# Patient Record
Sex: MALE | Race: WHITE
[De-identification: names, ages, dates, MRNs, and addresses within clinical notes are randomized per-mention and may not be internally consistent; named-entity substitution may affect disease eponyms.]

---

## 2019-10-27 ENCOUNTER — HOSPITAL ENCOUNTER (OUTPATIENT)
Dept: HOSPITAL 46 - ED | Age: 74
Setting detail: OBSERVATION
LOS: 2 days | Discharge: HOME | End: 2019-10-29
Attending: INTERNAL MEDICINE | Admitting: INTERNAL MEDICINE
Payer: MEDICARE

## 2019-10-27 VITALS — HEIGHT: 69 IN | BODY MASS INDEX: 33.77 KG/M2 | WEIGHT: 228 LBS

## 2019-10-27 DIAGNOSIS — Z85.46: ICD-10-CM

## 2019-10-27 DIAGNOSIS — N40.0: ICD-10-CM

## 2019-10-27 DIAGNOSIS — I48.20: ICD-10-CM

## 2019-10-27 DIAGNOSIS — Z23: ICD-10-CM

## 2019-10-27 DIAGNOSIS — Z79.01: ICD-10-CM

## 2019-10-27 DIAGNOSIS — Z79.899: ICD-10-CM

## 2019-10-27 DIAGNOSIS — L03.116: Primary | ICD-10-CM

## 2019-10-27 PROCEDURE — G0378 HOSPITAL OBSERVATION PER HR: HCPCS

## 2019-10-27 NOTE — NUR
BEDSIDE REPORT RECEIVED FROM OFFGOING RN, SEEMA. PT RESTING IN BED WITH WIFE
AT BEDSIDE. PT LLE VISUALIZED. PT'S WIFE STATES VAST IMPROVEMENT OF EXTREMITIY
AT THIS TIME. PT DENIES NEEDS, CALL LIGHT IN REACH.

## 2019-10-27 NOTE — XMS
Clinical Summary
  Created on: 10/27/2019
 
 Alex Hays
 External Reference #: [id number]
 : [birth year]
 Sex: Male
 
 Demographics
 
 
+-----------------------+----------------------+
| Address               | [address]      |
|                       | [place]  [address] |
+-----------------------+----------------------+
| Home Phone            | [phone]      |
+-----------------------+----------------------+
| Preferred Language    | Unknown              |
+-----------------------+----------------------+
| Marital Status        |               |
+-----------------------+----------------------+
| Congregational Affiliation | [id number]                 |
+-----------------------+----------------------+
| Race                  | Unknown              |
+-----------------------+----------------------+
| Ethnic Group          | Unknown              |
+-----------------------+----------------------+
 
 
 Author
 
 
+--------------+--------------------------------------------+
| Author       | [facility]  |
|              | and [place]                                |
+--------------+--------------------------------------------+
| Organization | [facility]  |
|              | and [place]                                |
+--------------+--------------------------------------------+
| Address      | Unknown                                    |
+--------------+--------------------------------------------+
| Phone        | Unavailable                                |
+--------------+--------------------------------------------+
 
 
 
 Support
 
 
+------------+--------------+-------------------+-----------------+
| Name       | Relationship | Address           | Phone           |
+------------+--------------+-------------------+-----------------+
| Frances Hays | ECON         | [address]        | [phone] |
|            |              | [place]   |                 |
|            |              | [address]             |                 |
+------------+--------------+-------------------+-----------------+
 
 
 
 
 Care Team Providers
 
 
+-------------------------+------+-----------------+
| Care Team Member Name   | Role | Phone           |
+-------------------------+------+-----------------+
| Chai Wetzel MD | PCP  | [phone] |
+-------------------------+------+-----------------+
 
 
 
 Allergies
 No Known Allergies
 
 Medications
 
 
+----------------------+----------------------+-----------+---------+------+------+-------+
| Medication           | Sig                  | Dispensed | Refills | Star | End  | Statu |
|                      |                      |           |         | t    | Date | s     |
|                      |                      |           |         | Date |      |       |
+----------------------+----------------------+-----------+---------+------+------+-------+
|   allopurinol        | Take 300 mg by mouth |           | 0       |      |      | Activ |
| (ZYLOPRIM) 300 mg    |  Daily.              |           |         |      |      | e     |
| tablet               |                      |           |         |      |      |       |
+----------------------+----------------------+-----------+---------+------+------+-------+
|   cholecalciferol    | Take 6,000 Units by  |           | 0       |      |      | Activ |
| (VITAMIN D-3) 1,000  | mouth Daily.         |           |         |      |      | e     |
| units capsule        |                      |           |         |      |      |       |
+----------------------+----------------------+-----------+---------+------+------+-------+
|   warfarin           | Take 4 mg by mouth   |           | 0       |      |      | Activ |
| (COUMADIN) 4 MG      | Daily. Takes 4mg for |           |         |      |      | e     |
| tablet               |  5 days, 6mg on      |           |         |      |      |       |
|                      | Tuesday and Thursday |           |         |      |      |       |
+----------------------+----------------------+-----------+---------+------+------+-------+
|   tamsulosin         |                      |           | 0       | 03/0 |      | Activ |
| (FLOMAX) 0.4 mg CAPS |                      |           |         | 6/20 |      | e     |
|                      |                      |           |         | 18   |      |       |
+----------------------+----------------------+-----------+---------+------+------+-------+
|   allopurinol        | Take 100 mg by mouth |           | 0       |      |      | Activ |
| (ZYLOPRIM) 100 mg    |  every day.          |           |         |      |      | e     |
| tablet               |                      |           |         |      |      |       |
+----------------------+----------------------+-----------+---------+------+------+-------+
|   warfarin           | Take 6 mg by mouth   |           | 0       |      |      | Activ |
| (COUMADIN) 1 mg      | every day.           |           |         |      |      | e     |
| tablet               |                      |           |         |      |      |       |
+----------------------+----------------------+-----------+---------+------+------+-------+
|   clobetasol         |                      |           | 1       | 03/0 |      | Activ |
| (TEMOVATE) 0.05 %    |                      |           |         | 5/20 |      | e     |
| external solution    |                      |           |         | 19   |      |       |
+----------------------+----------------------+-----------+---------+------+------+-------+
|   gabapentin         | TK 1 C PO QHS.       |           | 2       | 10/0 |      | Activ |
| (NEURONTIN) 100 mg   | INCREASE BY 1 C Q 2  |           |         | 2/20 |      | e     |
| capsule              | TO 3 NIGHTS UP TO 3  |           |         | 18   |      |       |
|                      | CS UP TO 3 CS HS     |           |         |      |      |       |
+----------------------+----------------------+-----------+---------+------+------+-------+
|   NYSTOP [id number]      | APPLY 1 APPLICATION  |           | 3       | 07/0 |      | Activ |
| UNIT/GM powder       | TO AFFECTED AREA BID |           |         | 1/20 |      | e     |
|                      |  FOR 30 DAYS         |           |         | 19   |      |       |
 
+----------------------+----------------------+-----------+---------+------+------+-------+
|   triamcinolone      | APPLY A THIN LAYER   |           | 0       | 05/0 |      | Activ |
| (KENALOG) 0.1% cream | TOPICALLY AA BID.    |           |         | 1/20 |      | e     |
|                      | FOLLOW WITH          |           |         | 19   |      |       |
|                      | MOISTURIZER AS       |           |         |      |      |       |
|                      | DIRECTED             |           |         |      |      |       |
+----------------------+----------------------+-----------+---------+------+------+-------+
|   warfarin           |                      |           | 3       | 05/2 |      | Activ |
| (COUMADIN) 3 MG      |                      |           |         | 4/20 |      | e     |
| tablet               |                      |           |         | 19   |      |       |
+----------------------+----------------------+-----------+---------+------+------+-------+
 
 
 
 Active Problems
 
 
+------------------------------------------------------------------+------------+
| Problem                                                          | Noted Date |
+------------------------------------------------------------------+------------+
| Malignant neoplasm of prostate                                   | 2018 |
+------------------------------------------------------------------+------------+
| Lumbar disc herniation                                           | 2017 |
+------------------------------------------------------------------+------------+
| Intervertebral disc disorder with radiculopathy of lumbar region | 2017 |
+------------------------------------------------------------------+------------+
| Lumbar radiculopathy                                             | 2017 |
+------------------------------------------------------------------+------------+
| Lumbar spinal stenosis                                           | 2017 |
+------------------------------------------------------------------+------------+
| Lumbar foraminal stenosis                                        | 2017 |
+------------------------------------------------------------------+------------+
| DDD (degenerative disc disease), lumbar                          | 2017 |
+------------------------------------------------------------------+------------+
| S/P laminectomy - L4/L5                                          | 2017 |
+------------------------------------------------------------------+------------+
| Ulnar neuropathy                                                 | 2013 |
+------------------------------------------------------------------+------------+
| SLEEP APNEA, OBSTRUCTIVE                                         | 2008 |
+------------------------------------------------------------------+------------+
| ATRIAL FIBRILLATION                                              | 2008 |
+------------------------------------------------------------------+------------+
 
 
 
 Encounters
 
 
+--------+-------------+--------------------+----------------------+---------------------+
| Date   | Type        | Specialty          | Care Team            | Description         |
+--------+-------------+--------------------+----------------------+---------------------+
| 10/18/ | Hospital    | Radiation Oncology |   Xenia Pulido  | Malignant neoplasm  |
| 2019   | Encounter   |                    | MD Yifan HARRISON,        | of prostate (HCC)   |
|        |             |                    | MD Talib           | (Primary Dx)        |
+--------+-------------+--------------------+----------------------+---------------------+
| 09/10/ | Orders Only | Radiation Oncology |   Talib Saha MD  | Malignant neoplasm  |
|    |             |                    |                      | of prostate (HCC)   |
|        |             |                    |                      | (Primary Dx)        |
+--------+-------------+--------------------+----------------------+---------------------+
 from Last 3 Months
 
 
 Family History
 
 
+----------------------+-----------+------+----------+
| Medical History      | Relation  | Name | Comments |
+----------------------+-----------+------+----------+
| Colon cancer         | Brother   |      |          |
+----------------------+-----------+------+----------+
| Heart surgery        | Brother   |      |          |
+----------------------+-----------+------+----------+
| No known problems    | Child     |      |          |
+----------------------+-----------+------+----------+
| No known problems    | Child     |      |          |
+----------------------+-----------+------+----------+
| No known problems    | Child     |      |          |
+----------------------+-----------+------+----------+
| No known problems    | Child     |      |          |
+----------------------+-----------+------+----------+
| Cancer               | Father    |      |          |
+----------------------+-----------+------+----------+
| Deep vein thrombosis | Father    |      |          |
+----------------------+-----------+------+----------+
| No known problems    | Maternal  |      |          |
|                      | Grandfath |      |          |
|                      | er        |      |          |
+----------------------+-----------+------+----------+
| No known problems    | Maternal  |      |          |
|                      | Grandmoth |      |          |
|                      | er        |      |          |
+----------------------+-----------+------+----------+
| Cancer               | Mother    |      |          |
+----------------------+-----------+------+----------+
| Hypertension         | Mother    |      |          |
+----------------------+-----------+------+----------+
| Gout                 | Paternal  |      |          |
|                      | Grandfath |      |          |
|                      | er        |      |          |
+----------------------+-----------+------+----------+
| Hypertension         | Paternal  |      |          |
|                      | Grandmoth |      |          |
|                      | er        |      |          |
+----------------------+-----------+------+----------+
 
 
 
+----------------------+------+----------+-----------------------------+
| Relation             | Name | Status   | Comments                    |
+----------------------+------+----------+-----------------------------+
| Brother              |      |  | HEART SURGERY COMPLICATIONS |
|                      |      |   (Age   |                             |
|                      |      | 66)      |                             |
+----------------------+------+----------+-----------------------------+
| Brother              |      |          |                             |
+----------------------+------+----------+-----------------------------+
| Child                |      | Other    | STATUS NOT LISTED           |
+----------------------+------+----------+-----------------------------+
| Child                |      | Other    | STATUS NOT LISTED           |
+----------------------+------+----------+-----------------------------+
| Child                |      | Other    | STATUS NOT LISTED           |
 
+----------------------+------+----------+-----------------------------+
| Child                |      | Other    | STATUS NOT LISTED           |
+----------------------+------+----------+-----------------------------+
| Child                |      |          |                             |
+----------------------+------+----------+-----------------------------+
| Child                |      |          |                             |
+----------------------+------+----------+-----------------------------+
| Child                |      |          |                             |
+----------------------+------+----------+-----------------------------+
| Child                |      |          |                             |
+----------------------+------+----------+-----------------------------+
| Father               |      |  |                             |
|                      |      |   (Age   |                             |
|                      |      | 84)      |                             |
+----------------------+------+----------+-----------------------------+
| Maternal Grandfather |      |  |                             |
+----------------------+------+----------+-----------------------------+
| Maternal Grandmother |      |  |                             |
+----------------------+------+----------+-----------------------------+
| Mother               |      |  |                             |
|                      |      |   (Age   |                             |
|                      |      | 86)      |                             |
+----------------------+------+----------+-----------------------------+
| Paternal Grandfather |      |  |                             |
+----------------------+------+----------+-----------------------------+
| Paternal Grandmother |      |  |                             |
+----------------------+------+----------+-----------------------------+
 
 
 
 Social History
 
 
+--------------+-------+-----------+--------+------+
| Tobacco Use  | Types | Packs/Day | Years  | Date |
|              |       |           | Used   |      |
+--------------+-------+-----------+--------+------+
| Never Smoker |       |           |        |      |
+--------------+-------+-----------+--------+------+
 
 
 
+---------------------+---+---+---+
| Smokeless Tobacco:  |   |   |   |
| Never Used          |   |   |   |
+---------------------+---+---+---+
 
 
 
+-------------+-----------+---------+--------------+
| Alcohol Use | Drinks/We | oz/Week | Comments     |
|             | ek        |         |              |
+-------------+-----------+---------+--------------+
| Yes         |   0       | 0.0     | occasionally |
|             | Standard  |         |              |
|             | drinks or |         |              |
|             |           |         |              |
|             | equivalen |         |              |
|             | t         |         |              |
+-------------+-----------+---------+--------------+
 
 
 
 
+------------------+---------------+
| Sex Assigned at  | Date Recorded |
| Birth            |               |
+------------------+---------------+
| Not on file      |               |
+------------------+---------------+
 
 
 
+----------------+-------------+-------------+
| Job Start Date | Occupation  | Industry    |
+----------------+-------------+-------------+
| Not on file    | Not on file | Not on file |
+----------------+-------------+-------------+
 
 
 
+----------------+--------------+------------+
| Travel History | Travel Start | Travel End |
+----------------+--------------+------------+
 
 
 
+-------------------------------------+
| No recent travel history available. |
+-------------------------------------+
 
 
 
 Last Filed Vital Signs
 
 
+-------------------+-------------------+---------------------+
| Vital Sign        | Reading           | Time Taken          |
+-------------------+-------------------+---------------------+
| Blood Pressure    | 123/79            | 2018 1157 PST |
+-------------------+-------------------+---------------------+
| Pulse             | 87                | [id number] PDT |
+-------------------+-------------------+---------------------+
| Temperature       | -                 | -                   |
+-------------------+-------------------+---------------------+
| Respiratory Rate  | 16                | 2017 PDT |
+-------------------+-------------------+---------------------+
| Oxygen Saturation | -                 | -                   |
+-------------------+-------------------+---------------------+
| Inhaled Oxygen    | -                 | -                   |
| Concentration     |                   |                     |
+-------------------+-------------------+---------------------+
| Weight            | 104.8 kg (231 lb) | 2018 PST |
+-------------------+-------------------+---------------------+
| Height            | 175.3 cm (5' 9")  | 2018 PST |
+-------------------+-------------------+---------------------+
| Body Mass Index   | 34.11             | 2018 1157 PST |
+-------------------+-------------------+---------------------+
 
 
 
 
 Plan of Treatment
 
 
+---------------------+-----------+---------------------------------+----------+
| Health Maintenance  | Due Date  | Last Done                       | Comments |
+---------------------+-----------+---------------------------------+----------+
| Vaccine:            | 10/12/196 |                                 |          |
| Dtap/Tdap/Td (1 -   | 2         |                                 |          |
| Tdap)               |           |                                 |          |
+---------------------+-----------+---------------------------------+----------+
| Vaccine:            | 10/12/200 |                                 |          |
| Pneumococcal 65+    | 8         |                                 |          |
| Low/Medium Risk (1  |           |                                 |          |
| of 2 - PCV13)       |           |                                 |          |
+---------------------+-----------+---------------------------------+----------+
| Vaccine: Zoster (2  |  | 2014                      |          |
| of 3)               | 4         |                                 |          |
+---------------------+-----------+---------------------------------+----------+
| Vaccine: Influenza  |  | 2018, 2018,         |          |
| (#1)                | 9         | 2016, Additional history  |          |
|                     |           | exists                          |          |
+---------------------+-----------+---------------------------------+----------+
 
 
 
 Results
 Not on filefrom Last 3 Months
 
 Insurance
 
 
+-----------------+--------+-------------+--------+-------------+---------+--------+
| Payer           | Benefi | Subscriber  | Effect | Phone       | Address | Type   |
|                 | t Plan | ID          | flor    |             |         |        |
|                 |  /     |             | Dates  |             |         |        |
|                 | Group  |             |        |             |         |        |
+-----------------+--------+-------------+--------+-------------+---------+--------+
| MEDICARE        | MEDICA | [id number]  | 10/1/2 | [phone] |         | [employer] |
|                 | RE     |             | 008-Pr | 5           |         | re     |
|                 | PART A |             | esent  |             |         |        |
|                 |  AND B |             |        |             |         |        |
+-----------------+--------+-------------+--------+-------------+---------+--------+
| [employer] | [employer] | [id number]    |  | [phone] |         | Indemn |
|                 |  OF    |             | 015-Pr | 0           |         | ity    |
|                 | [employer]  |             | esent  |             |         |        |
|                 | MDCR   |             |        |             |         |        |
|                 | SUPPL  |             |        |             |         |        |
+-----------------+--------+-------------+--------+-------------+---------+--------+
 
 
 
+------------------+--------+-------------+--------+-------------+---------------------+
| Guarantor Name   | Accoun | Relation to | Date   | Phone       | Billing Address     |
|                  | t Type |  Patient    | of     |             |                     |
|                  |        |             | Birth  |             |                     |
+------------------+--------+-------------+--------+-------------+---------------------+
| Alex Hays | Person | Self        | [birth year]             |   [address]   |
|                  | al/Fam |             | 1945   | [phone] | [place] [address] |
|                  | chyna    |             |        | 8 (Home)    |                     |
+------------------+--------+-------------+--------+-------------+---------------------+
 
| Alex Hays | Person | Self        | [birth year] |             |   [address]   |
|                  | al/Fam |             | 1945   | [phone] | [place] [address] |
|                  | chyna    |             |        | 8 (Home)    |                     |
+------------------+--------+-------------+--------+-------------+---------------------+
 
 
 
 Advance Directives
 Patient has advance care planning documents on file. For more information, please contact:[facility] [place] [address]

## 2019-10-27 NOTE — XMS
Encounter Summary
  Created on: 10/27/2019
 
 Alex Hays
 External Reference #: [id number]
 : [birth year]
 Sex: Male
 
 Demographics
 
 
+-----------------------+----------------------+
| Address               | [address]      |
|                       | [place]  [address] |
+-----------------------+----------------------+
| Home Phone            | [phone]      |
+-----------------------+----------------------+
| Preferred Language    | Unknown              |
+-----------------------+----------------------+
| Marital Status        |               |
+-----------------------+----------------------+
| Rastafarian Affiliation | [id number]                 |
+-----------------------+----------------------+
| Race                  | Unknown              |
+-----------------------+----------------------+
| Ethnic Group          | Unknown              |
+-----------------------+----------------------+
 
 
 Author
 
 
+--------------+--------------------------------------------+
| Author       | [facility]  |
|              | and [facility]                                |
+--------------+--------------------------------------------+
| Organization | [facility]  |
|              | and [place]                                |
+--------------+--------------------------------------------+
| Address      | Unknown                                    |
+--------------+--------------------------------------------+
| Phone        | Unavailable                                |
+--------------+--------------------------------------------+
 
 
 
 Support
 
 
+------------+--------------+-------------------+-----------------+
| Name       | Relationship | Address           | Phone           |
+------------+--------------+-------------------+-----------------+
| Frances Hays | ECON         | [address]        | [phone] |
|            |              | [place]   |                 |
|            |              | [address]             |                 |
+------------+--------------+-------------------+-----------------+
 
 
 
 
 Care Team Providers
 
 
+-------------------------+------+-----------------+
| Care Team Member Name   | Role | Phone           |
+-------------------------+------+-----------------+
| Chai Wetzel MD | PCP  | [phone] |
+-------------------------+------+-----------------+
 
 
 
 Encounter Details
 
 
+--------+-------------+----------------------+----------------------+---------------------+
| Date   | Type        | Department           | Care Team            | Description         |
+--------+-------------+----------------------+----------------------+---------------------+
| 09/10/ | Orders Only |   [facility]     |   Talib Saha MD  | Malignant neoplasm  |
| 2019   |             | [facility]    |  [address]   | of prostate (HCC)   |
|        |             | [address] | [address]  [place], [place]     | (Primary Dx)        |
|        |             |   [place], [place]        | [address]  [phone]  |                     |
|        |             | [address]           |  [phone] (Fax)  |                     |
|        |             | [phone]         |                      |                     |
+--------+-------------+----------------------+----------------------+---------------------+
 
 
 
 Social History
 
 
+--------------+-------+-----------+--------+------+
| Tobacco Use  | Types | Packs/Day | Years  | Date |
|              |       |           | Used   |      |
+--------------+-------+-----------+--------+------+
| Never Smoker |       |           |        |      |
+--------------+-------+-----------+--------+------+
 
 
 
+---------------------+---+---+---+
| Smokeless Tobacco:  |   |   |   |
| Never Used          |   |   |   |
+---------------------+---+---+---+
 
 
 
+-------------+-----------+---------+--------------+
| Alcohol Use | Drinks/We | oz/Week | Comments     |
|             | ek        |         |              |
+-------------+-----------+---------+--------------+
| Yes         |   0       | 0.0     | occasionally |
|             | Standard  |         |              |
|             | drinks or |         |              |
|             |           |         |              |
|             | equivalen |         |              |
|             | t         |         |              |
+-------------+-----------+---------+--------------+
 
 
 
 
+------------------+---------------+
| Sex Assigned at  | Date Recorded |
| Birth            |               |
+------------------+---------------+
| Not on file      |               |
+------------------+---------------+
 
 
 
+----------------+-------------+-------------+
| Job Start Date | Occupation  | Industry    |
+----------------+-------------+-------------+
| Not on file    | Not on file | Not on file |
+----------------+-------------+-------------+
 
 
 
+----------------+--------------+------------+
| Travel History | Travel Start | Travel End |
+----------------+--------------+------------+
 
 
 
+-------------------------------------+
| No recent travel history available. |
+-------------------------------------+
 documented as of this encounter
 
 Plan of Treatment
 
 
+-----------------+--------+----------------------+----------------------+
| Name            | Priori | Associated Diagnoses | Order Schedule       |
|                 | ty     |                      |                      |
+-----------------+--------+----------------------+----------------------+
| PSA, Diagnostic | Routin |   Malignant neoplasm | 1 Occurrences        |
|                 | e      |  of prostate (HCC)   | starting 09/10/2019  |
|                 |        |                      | until 09/10/2020     |
+-----------------+--------+----------------------+----------------------+
 documented as of this encounter
 
 Visit Diagnoses
 
 
+----------------------------------------------------------------------------------+
| Diagnosis                                                                        |
+----------------------------------------------------------------------------------+
|   Malignant neoplasm of prostate (HCC) - Primary  Malignant neoplasm of prostate |
+----------------------------------------------------------------------------------+
 documented in this encounter

## 2019-10-27 NOTE — XMS
Encounter Summary
  Created on: 10/27/2019
 
 Alex Hays
 External Reference #: [id number]
 : [birth year]
 Sex: Male
 
 Demographics
 
 
+-----------------------+----------------------+
| Address               | [address]      |
|                       | [place]  [address] |
+-----------------------+----------------------+
| Home Phone            | [phone]      |
+-----------------------+----------------------+
| Preferred Language    | Unknown              |
+-----------------------+----------------------+
| Marital Status        |               |
+-----------------------+----------------------+
| Mu-ism Affiliation | 1069                 |
+-----------------------+----------------------+
| Race                  | Unknown              |
+-----------------------+----------------------+
| Ethnic Group          | Unknown              |
+-----------------------+----------------------+
 
 
 Author
 
 
+--------------+--------------------------------------------+
| Author       | [facility]  |
|              | and [facility]                                |
+--------------+--------------------------------------------+
| Organization | [facility]  |
|              | and [place]                                |
+--------------+--------------------------------------------+
| Address      | Unknown                                    |
+--------------+--------------------------------------------+
| Phone        | Unavailable                                |
+--------------+--------------------------------------------+
 
 
 
 Support
 
 
+------------+--------------+-------------------+-----------------+
| Name       | Relationship | Address           | Phone           |
+------------+--------------+-------------------+-----------------+
| Frances Hays | ECON         | [address]        | [phone] |
|            |              | [place]   |                 |
|            |              | [address]             |                 |
+------------+--------------+-------------------+-----------------+
 
 
 
 
 Care Team Providers
 
 
+-------------------------+------+-----------------+
| Care Team Member Name   | Role | Phone           |
+-------------------------+------+-----------------+
| Chai Wetzel MD | PCP  | [phone] |
+-------------------------+------+-----------------+
 
 
 
 Reason for Visit
 
 
+-----------+----------+
| Reason    | Comments |
+-----------+----------+
| Follow-up |          |
+-----------+----------+
 
 
 
 Encounter Details
 
 
+--------+-----------+----------------------+----------------------+---------------------+
| Date   | Type      | Department           | Care Team            | Description         |
+--------+-----------+----------------------+----------------------+---------------------+
| 10/18/ | [facility]  |   [facility]     |   Xenia Pulido  | Malignant neoplasm  |
| 2019   | Encounter | [facility]    | MD CHRISTY  [address]  | of prostate (HCC)   |
|        |           | [address] | [address]  [place], [place]    | (Primary Dx)        |
|        |           |   [place]        | [address]  [phone]  |                     |
|        |           | [address]           |  [phone] (Fax)  |                     |
|        |           | [phone]         |  Talib Saha MD   |                     |
|        |           |                      | [address]  [address]    |                     |
|        |           |                      | [address]  [place], [place]     |                     |
|        |           |                      | [address]  [phone]  |                     |
|        |           |                      |  [phone] (Fax)  |                     |
+--------+-----------+----------------------+----------------------+---------------------+
 
 
 
 Social History
 
 
+--------------+-------+-----------+--------+------+
| Tobacco Use  | Types | Packs/Day | Years  | Date |
|              |       |           | Used   |      |
+--------------+-------+-----------+--------+------+
| Never Smoker |       |           |        |      |
+--------------+-------+-----------+--------+------+
 
 
 
+---------------------+---+---+---+
| Smokeless Tobacco:  |   |   |   |
| Never Used          |   |   |   |
+---------------------+---+---+---+
 
 
 
 
+-------------+-----------+---------+--------------+
| Alcohol Use | Drinks/We | oz/Week | Comments     |
|             | ek        |         |              |
+-------------+-----------+---------+--------------+
| Yes         |   0       | 0.0     | occasionally |
|             | Standard  |         |              |
|             | drinks or |         |              |
|             |           |         |              |
|             | equivalen |         |              |
|             | t         |         |              |
+-------------+-----------+---------+--------------+
 
 
 
+------------------+---------------+
| Sex Assigned at  | Date Recorded |
| Birth            |               |
+------------------+---------------+
| Not on file      |               |
+------------------+---------------+
 
 
 
+----------------+-------------+-------------+
| Job Start Date | Occupation  | Industry    |
+----------------+-------------+-------------+
| Not on file    | Not on file | Not on file |
+----------------+-------------+-------------+
 
 
 
+----------------+--------------+------------+
| Travel History | Travel Start | Travel End |
+----------------+--------------+------------+
 
 
 
+-------------------------------------+
| No recent travel history available. |
+-------------------------------------+
 documented as of this encounter
 
 Medications at Time of Discharge
 
 
+----------------------+----------------------+-----------+---------+----------+----------+
| Medication           | Sig                  | Dispensed | Refills | Start    | End Date |
|                      |                      |           |         | Date     |          |
+----------------------+----------------------+-----------+---------+----------+----------+
|   allopurinol        | Take 100 mg by mouth |           | 0       |          |          |
| (ZYLOPRIM) 100 mg    |  every day.          |           |         |          |          |
| tablet               |                      |           |         |          |          |
+----------------------+----------------------+-----------+---------+----------+----------+
|   allopurinol        | Take 300 mg by mouth |           | 0       |          |          |
| (ZYLOPRIM) 300 mg    |  Daily.              |           |         |          |          |
| tablet               |                      |           |         |          |          |
+----------------------+----------------------+-----------+---------+----------+----------+
|   cholecalciferol    | Take 6,000 Units by  |           | 0       |          |          |
 
| (VITAMIN D-3) 1,000  | mouth Daily.         |           |         |          |          |
| units capsule        |                      |           |         |          |          |
+----------------------+----------------------+-----------+---------+----------+----------+
|   clobetasol         |                      |           | 1       | / |          |
| (TEMOVATE) 0.05 %    |                      |           |         | 19       |          |
| external solution    |                      |           |         |          |          |
+----------------------+----------------------+-----------+---------+----------+----------+
|   gabapentin         | TK 1 C PO QHS.       |           | 2       | 10/02/20 |          |
| (NEURONTIN) 100 mg   | INCREASE BY 1 C Q 2  |           |         | 18       |          |
| capsule              | TO 3 NIGHTS UP TO 3  |           |         |          |          |
|                      | CS UP TO 3 CS HS     |           |         |          |          |
+----------------------+----------------------+-----------+---------+----------+----------+
|   NYSTOP [id number]      | APPLY 1 APPLICATION  |           | 3       | 20 |          |
| UNIT/GM powder       | TO AFFECTED AREA BID |           |         | 19       |          |
|                      |  FOR 30 DAYS         |           |         |          |          |
+----------------------+----------------------+-----------+---------+----------+----------+
|   tamsulosin         |                      |           | 0       | 20 |          |
| (FLOMAX) 0.4 mg CAPS |                      |           |         | 18       |          |
+----------------------+----------------------+-----------+---------+----------+----------+
|   triamcinolone      | APPLY A THIN LAYER   |           | 0       | 20 |          |
| (KENALOG) 0.1% cream | TOPICALLY AA BID.    |           |         | 19       |          |
|                      | FOLLOW WITH          |           |         |          |          |
|                      | MOISTURIZER AS       |           |         |          |          |
|                      | DIRECTED             |           |         |          |          |
+----------------------+----------------------+-----------+---------+----------+----------+
|   warfarin           | Take 6 mg by mouth   |           | 0       |          |          |
| (COUMADIN) 1 mg      | every day.           |           |         |          |          |
| tablet               |                      |           |         |          |          |
+----------------------+----------------------+-----------+---------+----------+----------+
|   warfarin           |                      |           | 3       | 20 |          |
| (COUMADIN) 3 MG      |                      |           |         | 19       |          |
| tablet               |                      |           |         |          |          |
+----------------------+----------------------+-----------+---------+----------+----------+
|   warfarin           | Take 4 mg by mouth   |           | 0       |          |          |
| (COUMADIN) 4 MG      | Daily. Takes 4mg for |           |         |          |          |
| tablet               |  5 days, 6mg on      |           |         |          |          |
|                      | Tuesday and Thursday |           |         |          |          |
+----------------------+----------------------+-----------+---------+----------+----------+
 documented as of this encounter
 
 Progress Notes
 Kacy Polk RN - 10/18/2019 1000 PDTDate Cyberknife Treatment Completed for Prostate Ca
ncer: 3/6/2018    
 
 Follow Up Interval: 1.5 Years
 
 
 AUA Score:  22/35 on 9/10/19
    16/35 on 18
    26/35  Pre-treatment
 
 Pt had a Urolift about two months ago to assist with the "free flow" of his urinary functio
n.  He continues to have frequency and urgency.  He denies dribbling, pain, bleeding, and re
ports that his bowel function is at his normal baseline. 
 Nocturia x 3 
 
 
 Recent PSA:  0.137 on 10/9/19
    0.03 on 18 
    0.01 on 18
 
    3.4 Pre-treatment
 
 
 He is currently following with his Urologist, Dr. Mendez in [place], but will reach out
 to us with any questions or concerns.
 
 Next follow-up appointment and PSA:  2 Years, 2020Electronically signed by Kacy STAPLETON RN at 10/21/2019 16:55 PDTdocumented in this encounter
 
 Plan of Treatment
 
 
+-----------------+--------+----------------------+----------------------+
| Name            | Priori | Associated Diagnoses | Order Schedule       |
|                 | ty     |                      |                      |
+-----------------+--------+----------------------+----------------------+
| PSA, Diagnostic | Routin |   Malignant neoplasm | 1 Occurrences        |
|                 | e      |  of prostate (HCC)   | starting 10/21/2019  |
|                 |        |                      | until 10/21/2020     |
+-----------------+--------+----------------------+----------------------+
 documented as of this encounter
 
 Visit Diagnoses
 
 
+----------------------------------------------------------------------------------+
| Diagnosis                                                                        |
+----------------------------------------------------------------------------------+
|   Malignant neoplasm of prostate (HCC) - Primary  Malignant neoplasm of prostate |
+----------------------------------------------------------------------------------+
 documented in this encounter

## 2019-10-27 NOTE — XMS
Encounter Summary
  Created on: 10/27/2019
 
 Alex Hays
 External Reference #: [id number]
 : [birth year]
 Sex: Male
 
 Demographics
 
 
+-----------------------+----------------------+
| Address               | [address]      |
|                       | [place]  [address] |
+-----------------------+----------------------+
| Home Phone            | [phone]      |
+-----------------------+----------------------+
| Preferred Language    | Unknown              |
+-----------------------+----------------------+
| Marital Status        |               |
+-----------------------+----------------------+
| Yarsani Affiliation | [id number]                 |
+-----------------------+----------------------+
| Race                  | Unknown              |
+-----------------------+----------------------+
| Ethnic Group          | Unknown              |
+-----------------------+----------------------+
 
 
 Author
 
 
+--------------+--------------------------------------------+
| Author       | [facility]  |
|              | and [facility]                                |
+--------------+--------------------------------------------+
| Organization | [facility]  |
|              | and [place]                                |
+--------------+--------------------------------------------+
| Address      | Unknown                                    |
+--------------+--------------------------------------------+
| Phone        | Unavailable                                |
+--------------+--------------------------------------------+
 
 
 
 Support
 
 
+------------+--------------+-------------------+-----------------+
| Name       | Relationship | Address           | Phone           |
+------------+--------------+-------------------+-----------------+
| Frances Hays | ECON         | [address]        | [phone] |
|            |              | [place]   |                 |
|            |              | [address]             |                 |
+------------+--------------+-------------------+-----------------+
 
 
 
 
 Care Team Providers
 
 
+-------------------------+------+-----------------+
| Care Team Member Name   | Role | Phone           |
+-------------------------+------+-----------------+
| Chai Wetzel MD | PCP  | [phone] |
+-------------------------+------+-----------------+
 
 
 
 Reason for Visit
 
 
+-----------+----------+
| Reason    | Comments |
+-----------+----------+
| Follow-up |          |
+-----------+----------+
 
 
 
 Encounter Details
 
 
+--------+-----------+----------------------+----------------------+---------------------+
| Date   | Type      | Department           | Care Team            | Description         |
+--------+-----------+----------------------+----------------------+---------------------+
| 10/18/ | [facility]  |   [facility]     |   Xenia Pulido  | Malignant neoplasm  |
| 2019   | Encounter | [facility]    | MD CHRISTY  [address]  | of prostate (HCC)   |
|        |           | [address] | [address]  [place], [place]    | (Primary Dx)        |
|        |           |   [place], [place]        | [address]  [phone]  |                     |
|        |           | [address]           |  [phone] (Fax)  |                     |
|        |           | [phone]         |  Talib Saha MD   |                     |
|        |           |                      | [address]  [address]    |                     |
|        |           |                      | [address]  [place], [place]     |                     |
|        |           |                      | [address]  [phone]  |                     |
|        |           |                      |  [phone] (Fax)  |                     |
+--------+-----------+----------------------+----------------------+---------------------+
 
 
 
 Social History
 
 
+--------------+-------+-----------+--------+------+
| Tobacco Use  | Types | Packs/Day | Years  | Date |
|              |       |           | Used   |      |
+--------------+-------+-----------+--------+------+
| Never Smoker |       |           |        |      |
+--------------+-------+-----------+--------+------+
 
 
 
+---------------------+---+---+---+
| Smokeless Tobacco:  |   |   |   |
| Never Used          |   |   |   |
+---------------------+---+---+---+
 
 
 
 
+-------------+-----------+---------+--------------+
| Alcohol Use | Drinks/We | oz/Week | Comments     |
|             | ek        |         |              |
+-------------+-----------+---------+--------------+
| Yes         |   0       | 0.0     | occasionally |
|             | Standard  |         |              |
|             | drinks or |         |              |
|             |           |         |              |
|             | equivalen |         |              |
|             | t         |         |              |
+-------------+-----------+---------+--------------+
 
 
 
+------------------+---------------+
| Sex Assigned at  | Date Recorded |
| Birth            |               |
+------------------+---------------+
| Not on file      |               |
+------------------+---------------+
 
 
 
+----------------+-------------+-------------+
| Job Start Date | Occupation  | Industry    |
+----------------+-------------+-------------+
| Not on file    | Not on file | Not on file |
+----------------+-------------+-------------+
 
 
 
+----------------+--------------+------------+
| Travel History | Travel Start | Travel End |
+----------------+--------------+------------+
 
 
 
+-------------------------------------+
| No recent travel history available. |
+-------------------------------------+
 documented as of this encounter
 
 Medications at Time of Discharge
 
 
+----------------------+----------------------+-----------+---------+----------+----------+
| Medication           | Sig                  | Dispensed | Refills | Start    | End Date |
|                      |                      |           |         | Date     |          |
+----------------------+----------------------+-----------+---------+----------+----------+
|   allopurinol        | Take 100 mg by mouth |           | 0       |          |          |
| (ZYLOPRIM) 100 mg    |  every day.          |           |         |          |          |
| tablet               |                      |           |         |          |          |
+----------------------+----------------------+-----------+---------+----------+----------+
|   allopurinol        | Take 300 mg by mouth |           | 0       |          |          |
| (ZYLOPRIM) 300 mg    |  Daily.              |           |         |          |          |
| tablet               |                      |           |         |          |          |
+----------------------+----------------------+-----------+---------+----------+----------+
|   cholecalciferol    | Take 6,000 Units by  |           | 0       |          |          |
 
| (VITAMIN D-3) 1,000  | mouth Daily.         |           |         |          |          |
| units capsule        |                      |           |         |          |          |
+----------------------+----------------------+-----------+---------+----------+----------+
|   clobetasol         |                      |           | 1       | / |          |
| (TEMOVATE) 0.05 %    |                      |           |         | 19       |          |
| external solution    |                      |           |         |          |          |
+----------------------+----------------------+-----------+---------+----------+----------+
|   gabapentin         | TK 1 C PO QHS.       |           | 2       | 10/02/20 |          |
| (NEURONTIN) 100 mg   | INCREASE BY 1 C Q 2  |           |         | 18       |          |
| capsule              | TO 3 NIGHTS UP TO 3  |           |         |          |          |
|                      | CS UP TO 3 CS HS     |           |         |          |          |
+----------------------+----------------------+-----------+---------+----------+----------+
|   NYSTOP [id number]      | APPLY 1 APPLICATION  |           | 3       | 20 |          |
| UNIT/GM powder       | TO AFFECTED AREA BID |           |         | 19       |          |
|                      |  FOR 30 DAYS         |           |         |          |          |
+----------------------+----------------------+-----------+---------+----------+----------+
|   tamsulosin         |                      |           | 0       | 20 |          |
| (FLOMAX) 0.4 mg CAPS |                      |           |         | 18       |          |
+----------------------+----------------------+-----------+---------+----------+----------+
|   triamcinolone      | APPLY A THIN LAYER   |           | 0       | 20 |          |
| (KENALOG) 0.1% cream | TOPICALLY AA BID.    |           |         | 19       |          |
|                      | FOLLOW WITH          |           |         |          |          |
|                      | MOISTURIZER AS       |           |         |          |          |
|                      | DIRECTED             |           |         |          |          |
+----------------------+----------------------+-----------+---------+----------+----------+
|   warfarin           | Take 6 mg by mouth   |           | 0       |          |          |
| (COUMADIN) 1 mg      | every day.           |           |         |          |          |
| tablet               |                      |           |         |          |          |
+----------------------+----------------------+-----------+---------+----------+----------+
|   warfarin           |                      |           | 3       | 20 |          |
| (COUMADIN) 3 MG      |                      |           |         | 19       |          |
| tablet               |                      |           |         |          |          |
+----------------------+----------------------+-----------+---------+----------+----------+
|   warfarin           | Take 4 mg by mouth   |           | 0       |          |          |
| (COUMADIN) 4 MG      | Daily. Takes 4mg for |           |         |          |          |
| tablet               |  5 days, 6mg on      |           |         |          |          |
|                      | Tuesday and Thursday |           |         |          |          |
+----------------------+----------------------+-----------+---------+----------+----------+
 documented as of this encounter
 
 Progress Notes
 Kacy Polk RN - 10/18/2019 1000 PDTDate Cyberknife Treatment Completed for Prostate Ca
ncer: 3/6/2018    
 
 Follow Up Interval: 1.5 Years
 
 
 AUA Score:  22/35 on 9/10/19
    16/35 on 18
    26/35  Pre-treatment
 
 Pt had a Urolift about two months ago to assist with the "free flow" of his urinary functio
n.  He continues to have frequency and urgency.  He denies dribbling, pain, bleeding, and re
ports that his bowel function is at his normal baseline. 
 Nocturia x 3 
 
 
 Recent PSA:  0.137 on 10/9/19
    0.03 on 18 
    0.01 on 18
 
    3.4 Pre-treatment
 
 
 He is currently following with his Urologist, Dr. Mendez in [place], but will reach out
 to us with any questions or concerns.
 
 Next follow-up appointment and PSA:  2 Years, 2020Electronically signed by Kacy STAPLETON RN at 10/21/2019 16:55 PDTdocumented in this encounter
 
 Plan of Treatment
 
 
+-----------------+--------+----------------------+----------------------+
| Name            | Priori | Associated Diagnoses | Order Schedule       |
|                 | ty     |                      |                      |
+-----------------+--------+----------------------+----------------------+
| PSA, Diagnostic | Routin |   Malignant neoplasm | 1 Occurrences        |
|                 | e      |  of prostate (HCC)   | starting 10/21/2019  |
|                 |        |                      | until 10/21/2020     |
+-----------------+--------+----------------------+----------------------+
 documented as of this encounter
 
 Visit Diagnoses
 
 
+----------------------------------------------------------------------------------+
| Diagnosis                                                                        |
+----------------------------------------------------------------------------------+
|   Malignant neoplasm of prostate (HCC) - Primary  Malignant neoplasm of prostate |
+----------------------------------------------------------------------------------+
 documented in this encounter

## 2019-10-27 NOTE — XMS
Encounter Summary
  Created on: 10/27/2019
 
 Alex Hays
 External Reference #: [id number]
 : [birth year]
 Sex: Male
 
 Demographics
 
 
+-----------------------+----------------------+
| Address               | [address]      |
|                       | [place]  [address] |
+-----------------------+----------------------+
| Home Phone            | [phone]      |
+-----------------------+----------------------+
| Preferred Language    | Unknown              |
+-----------------------+----------------------+
| Marital Status        |               |
+-----------------------+----------------------+
| Evangelical Affiliation | 1069                 |
+-----------------------+----------------------+
| Race                  | Unknown              |
+-----------------------+----------------------+
| Ethnic Group          | Unknown              |
+-----------------------+----------------------+
 
 
 Author
 
 
+--------------+--------------------------------------------+
| Author       | [facility]  |
|              | and [facility]                                |
+--------------+--------------------------------------------+
| Organization | [facility]  |
|              | and [place]                                |
+--------------+--------------------------------------------+
| Address      | Unknown                                    |
+--------------+--------------------------------------------+
| Phone        | Unavailable                                |
+--------------+--------------------------------------------+
 
 
 
 Support
 
 
+------------+--------------+-------------------+-----------------+
| Name       | Relationship | Address           | Phone           |
+------------+--------------+-------------------+-----------------+
| Frances Hays | ECON         | [address]        | [phone] |
|            |              | [place]   |                 |
|            |              | [address]             |                 |
+------------+--------------+-------------------+-----------------+
 
 
 
 
 Care Team Providers
 
 
+-------------------------+------+-----------------+
| Care Team Member Name   | Role | Phone           |
+-------------------------+------+-----------------+
| Chai Wetzel MD | PCP  | [phone] |
+-------------------------+------+-----------------+
 
 
 
 Encounter Details
 
 
+--------+-------------+----------------------+----------------------+---------------------+
| Date   | Type        | Department           | Care Team            | Description         |
+--------+-------------+----------------------+----------------------+---------------------+
| 09/10/ | Orders Only |   [facility]     |   Talib Saha MD  | Malignant neoplasm  |
| 2019   |             | [facility]    |  [address]   | of prostate (HCC)   |
|        |             | [address] | [address]  [place], [place]     | (Primary Dx)        |
|        |             |   [place], [place]        | [address]  [phone]  |                     |
|        |             | [address]           |  [phone] (Fax)  |                     |
|        |             | [phone]         |                      |                     |
+--------+-------------+----------------------+----------------------+---------------------+
 
 
 
 Social History
 
 
+--------------+-------+-----------+--------+------+
| Tobacco Use  | Types | Packs/Day | Years  | Date |
|              |       |           | Used   |      |
+--------------+-------+-----------+--------+------+
| Never Smoker |       |           |        |      |
+--------------+-------+-----------+--------+------+
 
 
 
+---------------------+---+---+---+
| Smokeless Tobacco:  |   |   |   |
| Never Used          |   |   |   |
+---------------------+---+---+---+
 
 
 
+-------------+-----------+---------+--------------+
| Alcohol Use | Drinks/We | oz/Week | Comments     |
|             | ek        |         |              |
+-------------+-----------+---------+--------------+
| Yes         |   0       | 0.0     | occasionally |
|             | Standard  |         |              |
|             | drinks or |         |              |
|             |           |         |              |
|             | equivalen |         |              |
|             | t         |         |              |
+-------------+-----------+---------+--------------+
 
 
 
 
+------------------+---------------+
| Sex Assigned at  | Date Recorded |
| Birth            |               |
+------------------+---------------+
| Not on file      |               |
+------------------+---------------+
 
 
 
+----------------+-------------+-------------+
| Job Start Date | Occupation  | Industry    |
+----------------+-------------+-------------+
| Not on file    | Not on file | Not on file |
+----------------+-------------+-------------+
 
 
 
+----------------+--------------+------------+
| Travel History | Travel Start | Travel End |
+----------------+--------------+------------+
 
 
 
+-------------------------------------+
| No recent travel history available. |
+-------------------------------------+
 documented as of this encounter
 
 Plan of Treatment
 
 
+-----------------+--------+----------------------+----------------------+
| Name            | Priori | Associated Diagnoses | Order Schedule       |
|                 | ty     |                      |                      |
+-----------------+--------+----------------------+----------------------+
| PSA, Diagnostic | Routin |   Malignant neoplasm | 1 Occurrences        |
|                 | e      |  of prostate (HCC)   | starting 09/10/2019  |
|                 |        |                      | until 09/10/2020     |
+-----------------+--------+----------------------+----------------------+
 documented as of this encounter
 
 Visit Diagnoses
 
 
+----------------------------------------------------------------------------------+
| Diagnosis                                                                        |
+----------------------------------------------------------------------------------+
|   Malignant neoplasm of prostate (HCC) - Primary  Malignant neoplasm of prostate |
+----------------------------------------------------------------------------------+
 documented in this encounter

## 2019-10-27 NOTE — NUR
PT C/O HEADACHE. PRN TYLENOL ADMINISTERED. RT IN ROOM TO SET UP CPAP MACHINE.
PT PROVIDED WITH ICE WATER AND WARM BLANKET. PT DENIES FURTHER NEEDS AT THIS
TIME. CALL LIGHT IN REACH. PT VERY THANKFUL.

## 2019-10-27 NOTE — XMS
Clinical Summary
  Created on: 10/27/2019
 
 Alex Hays
 External Reference #: [id number]
 : [birth year]
 Sex: Male
 
 Demographics
 
 
+-----------------------+----------------------+
| Address               | [address]      |
|                       | [place]  [address] |
+-----------------------+----------------------+
| Home Phone            | [phone]      |
+-----------------------+----------------------+
| Preferred Language    | Unknown              |
+-----------------------+----------------------+
| Marital Status        |               |
+-----------------------+----------------------+
| Sabianist Affiliation | [id number]                 |
+-----------------------+----------------------+
| Race                  | Unknown              |
+-----------------------+----------------------+
| Ethnic Group          | Unknown              |
+-----------------------+----------------------+
 
 
 Author
 
 
+--------------+--------------------------------------------+
| Author       | [facility]  |
|              | and [facility]                                |
+--------------+--------------------------------------------+
| Organization | [facility]  |
|              | and [place]                                |
+--------------+--------------------------------------------+
| Address      | Unknown                                    |
+--------------+--------------------------------------------+
| Phone        | Unavailable                                |
+--------------+--------------------------------------------+
 
 
 
 Support
 
 
+------------+--------------+-------------------+-----------------+
| Name       | Relationship | Address           | Phone           |
+------------+--------------+-------------------+-----------------+
| Frances Hays | ECON         | [address]        | [phone] |
|            |              | [place]   |                 |
|            |              | [address]             |                 |
+------------+--------------+-------------------+-----------------+
 
 
 
 
 Care Team Providers
 
 
+-------------------------+------+-----------------+
| Care Team Member Name   | Role | Phone           |
+-------------------------+------+-----------------+
| Chai Wetzel MD | PCP  | [phone] |
+-------------------------+------+-----------------+
 
 
 
 Allergies
 No Known Allergies
 
 Medications
 
 
+----------------------+----------------------+-----------+---------+------+------+-------+
| Medication           | Sig                  | Dispensed | Refills | Star | End  | Statu |
|                      |                      |           |         | t    | Date | s     |
|                      |                      |           |         | Date |      |       |
+----------------------+----------------------+-----------+---------+------+------+-------+
|   allopurinol        | Take 300 mg by mouth |           | 0       |      |      | Activ |
| (ZYLOPRIM) 300 mg    |  Daily.              |           |         |      |      | e     |
| tablet               |                      |           |         |      |      |       |
+----------------------+----------------------+-----------+---------+------+------+-------+
|   cholecalciferol    | Take 6,000 Units by  |           | 0       |      |      | Activ |
| (VITAMIN D-3) 1,000  | mouth Daily.         |           |         |      |      | e     |
| units capsule        |                      |           |         |      |      |       |
+----------------------+----------------------+-----------+---------+------+------+-------+
|   warfarin           | Take 4 mg by mouth   |           | 0       |      |      | Activ |
| (COUMADIN) 4 MG      | Daily. Takes 4mg for |           |         |      |      | e     |
| tablet               |  5 days, 6mg on      |           |         |      |      |       |
|                      | Tuesday and Thursday |           |         |      |      |       |
+----------------------+----------------------+-----------+---------+------+------+-------+
|   tamsulosin         |                      |           | 0       | 03/0 |      | Activ |
| (FLOMAX) 0.4 mg CAPS |                      |           |         | 6/20 |      | e     |
|                      |                      |           |         | 18   |      |       |
+----------------------+----------------------+-----------+---------+------+------+-------+
|   allopurinol        | Take 100 mg by mouth |           | 0       |      |      | Activ |
| (ZYLOPRIM) 100 mg    |  every day.          |           |         |      |      | e     |
| tablet               |                      |           |         |      |      |       |
+----------------------+----------------------+-----------+---------+------+------+-------+
|   warfarin           | Take 6 mg by mouth   |           | 0       |      |      | Activ |
| (COUMADIN) 1 mg      | every day.           |           |         |      |      | e     |
| tablet               |                      |           |         |      |      |       |
+----------------------+----------------------+-----------+---------+------+------+-------+
|   clobetasol         |                      |           | 1       | 03/0 |      | Activ |
| (TEMOVATE) 0.05 %    |                      |           |         | 5/20 |      | e     |
| external solution    |                      |           |         | 19   |      |       |
+----------------------+----------------------+-----------+---------+------+------+-------+
|   gabapentin         | TK 1 C PO QHS.       |           | 2       | 10/0 |      | Activ |
| (NEURONTIN) 100 mg   | INCREASE BY 1 C Q 2  |           |         | 2/20 |      | e     |
| capsule              | TO 3 NIGHTS UP TO 3  |           |         | 18   |      |       |
|                      | CS UP TO 3 CS HS     |           |         |      |      |       |
+----------------------+----------------------+-----------+---------+------+------+-------+
|   NYSTOP [id number]      | APPLY 1 APPLICATION  |           | 3       | 07/0 |      | Activ |
| UNIT/GM powder       | TO AFFECTED AREA BID |           |         | 1/20 |      | e     |
|                      |  FOR 30 DAYS         |           |         | 19   |      |       |
 
+----------------------+----------------------+-----------+---------+------+------+-------+
|   triamcinolone      | APPLY A THIN LAYER   |           | 0       | 05/0 |      | Activ |
| (KENALOG) 0.1% cream | TOPICALLY AA BID.    |           |         | 1/20 |      | e     |
|                      | FOLLOW WITH          |           |         | 19   |      |       |
|                      | MOISTURIZER AS       |           |         |      |      |       |
|                      | DIRECTED             |           |         |      |      |       |
+----------------------+----------------------+-----------+---------+------+------+-------+
|   warfarin           |                      |           | 3       | 05/2 |      | Activ |
| (COUMADIN) 3 MG      |                      |           |         | 4/20 |      | e     |
| tablet               |                      |           |         | 19   |      |       |
+----------------------+----------------------+-----------+---------+------+------+-------+
 
 
 
 Active Problems
 
 
+------------------------------------------------------------------+------------+
| Problem                                                          | Noted Date |
+------------------------------------------------------------------+------------+
| Malignant neoplasm of prostate                                   | 2018 |
+------------------------------------------------------------------+------------+
| Lumbar disc herniation                                           | 2017 |
+------------------------------------------------------------------+------------+
| Intervertebral disc disorder with radiculopathy of lumbar region | 2017 |
+------------------------------------------------------------------+------------+
| Lumbar radiculopathy                                             | 2017 |
+------------------------------------------------------------------+------------+
| Lumbar spinal stenosis                                           | 2017 |
+------------------------------------------------------------------+------------+
| Lumbar foraminal stenosis                                        | 2017 |
+------------------------------------------------------------------+------------+
| DDD (degenerative disc disease), lumbar                          | 2017 |
+------------------------------------------------------------------+------------+
| S/P laminectomy - L4/L5                                          | 2017 |
+------------------------------------------------------------------+------------+
| Ulnar neuropathy                                                 | 2013 |
+------------------------------------------------------------------+------------+
| SLEEP APNEA, OBSTRUCTIVE                                         | 2008 |
+------------------------------------------------------------------+------------+
| ATRIAL FIBRILLATION                                              | 2008 |
+------------------------------------------------------------------+------------+
 
 
 
 Encounters
 
 
+--------+-------------+--------------------+----------------------+---------------------+
| Date   | Type        | Specialty          | Care Team            | Description         |
+--------+-------------+--------------------+----------------------+---------------------+
| 10/18/ | Hospital    | Radiation Oncology |   Xenia Pulido  | Malignant neoplasm  |
| 2019   | Encounter   |                    | MD Yifan HARRISON,        | of prostate (HCC)   |
|        |             |                    | MD Talib           | (Primary Dx)        |
+--------+-------------+--------------------+----------------------+---------------------+
| 09/10/ | Orders Only | Radiation Oncology |   Talib Saha MD  | Malignant neoplasm  |
|    |             |                    |                      | of prostate (HCC)   |
|        |             |                    |                      | (Primary Dx)        |
+--------+-------------+--------------------+----------------------+---------------------+
 from Last 3 Months
 
 
 Family History
 
 
+----------------------+-----------+------+----------+
| Medical History      | Relation  | Name | Comments |
+----------------------+-----------+------+----------+
| Colon cancer         | Brother   |      |          |
+----------------------+-----------+------+----------+
| Heart surgery        | Brother   |      |          |
+----------------------+-----------+------+----------+
| No known problems    | Child     |      |          |
+----------------------+-----------+------+----------+
| No known problems    | Child     |      |          |
+----------------------+-----------+------+----------+
| No known problems    | Child     |      |          |
+----------------------+-----------+------+----------+
| No known problems    | Child     |      |          |
+----------------------+-----------+------+----------+
| Cancer               | Father    |      |          |
+----------------------+-----------+------+----------+
| Deep vein thrombosis | Father    |      |          |
+----------------------+-----------+------+----------+
| No known problems    | Maternal  |      |          |
|                      | Grandfath |      |          |
|                      | er        |      |          |
+----------------------+-----------+------+----------+
| No known problems    | Maternal  |      |          |
|                      | Grandmoth |      |          |
|                      | er        |      |          |
+----------------------+-----------+------+----------+
| Cancer               | Mother    |      |          |
+----------------------+-----------+------+----------+
| Hypertension         | Mother    |      |          |
+----------------------+-----------+------+----------+
| Gout                 | Paternal  |      |          |
|                      | Grandfath |      |          |
|                      | er        |      |          |
+----------------------+-----------+------+----------+
| Hypertension         | Paternal  |      |          |
|                      | Grandmoth |      |          |
|                      | er        |      |          |
+----------------------+-----------+------+----------+
 
 
 
+----------------------+------+----------+-----------------------------+
| Relation             | Name | Status   | Comments                    |
+----------------------+------+----------+-----------------------------+
| Brother              |      |  | HEART SURGERY COMPLICATIONS |
|                      |      |   (Age   |                             |
|                      |      | 66)      |                             |
+----------------------+------+----------+-----------------------------+
| Brother              |      |          |                             |
+----------------------+------+----------+-----------------------------+
| Child                |      | Other    | STATUS NOT LISTED           |
+----------------------+------+----------+-----------------------------+
| Child                |      | Other    | STATUS NOT LISTED           |
+----------------------+------+----------+-----------------------------+
| Child                |      | Other    | STATUS NOT LISTED           |
 
+----------------------+------+----------+-----------------------------+
| Child                |      | Other    | STATUS NOT LISTED           |
+----------------------+------+----------+-----------------------------+
| Child                |      |          |                             |
+----------------------+------+----------+-----------------------------+
| Child                |      |          |                             |
+----------------------+------+----------+-----------------------------+
| Child                |      |          |                             |
+----------------------+------+----------+-----------------------------+
| Child                |      |          |                             |
+----------------------+------+----------+-----------------------------+
| Father               |      |  |                             |
|                      |      |   (Age   |                             |
|                      |      | 84)      |                             |
+----------------------+------+----------+-----------------------------+
| Maternal Grandfather |      |  |                             |
+----------------------+------+----------+-----------------------------+
| Maternal Grandmother |      |  |                             |
+----------------------+------+----------+-----------------------------+
| Mother               |      |  |                             |
|                      |      |   (Age   |                             |
|                      |      | 86)      |                             |
+----------------------+------+----------+-----------------------------+
| Paternal Grandfather |      |  |                             |
+----------------------+------+----------+-----------------------------+
| Paternal Grandmother |      |  |                             |
+----------------------+------+----------+-----------------------------+
 
 
 
 Social History
 
 
+--------------+-------+-----------+--------+------+
| Tobacco Use  | Types | Packs/Day | Years  | Date |
|              |       |           | Used   |      |
+--------------+-------+-----------+--------+------+
| Never Smoker |       |           |        |      |
+--------------+-------+-----------+--------+------+
 
 
 
+---------------------+---+---+---+
| Smokeless Tobacco:  |   |   |   |
| Never Used          |   |   |   |
+---------------------+---+---+---+
 
 
 
+-------------+-----------+---------+--------------+
| Alcohol Use | Drinks/We | oz/Week | Comments     |
|             | ek        |         |              |
+-------------+-----------+---------+--------------+
| Yes         |   0       | 0.0     | occasionally |
|             | Standard  |         |              |
|             | drinks or |         |              |
|             |           |         |              |
|             | equivalen |         |              |
|             | t         |         |              |
+-------------+-----------+---------+--------------+
 
 
 
 
+------------------+---------------+
| Sex Assigned at  | Date Recorded |
| Birth            |               |
+------------------+---------------+
| Not on file      |               |
+------------------+---------------+
 
 
 
+----------------+-------------+-------------+
| Job Start Date | Occupation  | Industry    |
+----------------+-------------+-------------+
| Not on file    | Not on file | Not on file |
+----------------+-------------+-------------+
 
 
 
+----------------+--------------+------------+
| Travel History | Travel Start | Travel End |
+----------------+--------------+------------+
 
 
 
+-------------------------------------+
| No recent travel history available. |
+-------------------------------------+
 
 
 
 Last Filed Vital Signs
 
 
+-------------------+-------------------+---------------------+
| Vital Sign        | Reading           | Time Taken          |
+-------------------+-------------------+---------------------+
| Blood Pressure    | 123/79            | 2018 1157 PST |
+-------------------+-------------------+---------------------+
| Pulse             | 87                | [id number] PDT |
+-------------------+-------------------+---------------------+
| Temperature       | -                 | -                   |
+-------------------+-------------------+---------------------+
| Respiratory Rate  | 16                | 2017 PDT |
+-------------------+-------------------+---------------------+
| Oxygen Saturation | -                 | -                   |
+-------------------+-------------------+---------------------+
| Inhaled Oxygen    | -                 | -                   |
| Concentration     |                   |                     |
+-------------------+-------------------+---------------------+
| Weight            | 104.8 kg (231 lb) | 2018 PST |
+-------------------+-------------------+---------------------+
| Height            | 175.3 cm (5' 9")  | 2018 PST |
+-------------------+-------------------+---------------------+
| Body Mass Index   | 34.11             | 2018 1157 PST |
+-------------------+-------------------+---------------------+
 
 
 
 
 Plan of Treatment
 
 
+---------------------+-----------+---------------------------------+----------+
| Health Maintenance  | Due Date  | Last Done                       | Comments |
+---------------------+-----------+---------------------------------+----------+
| Vaccine:            | 10/12/196 |                                 |          |
| Dtap/Tdap/Td (1 -   | 2         |                                 |          |
| Tdap)               |           |                                 |          |
+---------------------+-----------+---------------------------------+----------+
| Vaccine:            | 10/12/200 |                                 |          |
| Pneumococcal 65+    | 8         |                                 |          |
| Low/Medium Risk (1  |           |                                 |          |
| of 2 - PCV13)       |           |                                 |          |
+---------------------+-----------+---------------------------------+----------+
| Vaccine: Zoster (2  |  | 2014                      |          |
| of 3)               | 4         |                                 |          |
+---------------------+-----------+---------------------------------+----------+
| Vaccine: Influenza  |  | 2018, 2018,         |          |
| (#1)                | 9         | 2016, Additional history  |          |
|                     |           | exists                          |          |
+---------------------+-----------+---------------------------------+----------+
 
 
 
 Results
 Not on filefrom Last 3 Months
 
 Insurance
 
 
+-----------------+--------+-------------+--------+-------------+---------+--------+
| Payer           | Benefi | Subscriber  | Effect | Phone       | Address | Type   |
|                 | t Plan | ID          | flor    |             |         |        |
|                 |  /     |             | Dates  |             |         |        |
|                 | Group  |             |        |             |         |        |
+-----------------+--------+-------------+--------+-------------+---------+--------+
| MEDICARE        | [employer] | [id number]  | 10/1/2 | [phone] |         | [employer] |
|                 | RE     |             | 008-Pr | 5           |         | re     |
|                 | PART A |             | esent  |             |         |        |
|                 |  AND B |             |        |             |         |        |
+-----------------+--------+-------------+--------+-------------+---------+--------+
| [employer] [place] | [employer] | [id number]    |  | [phone] |         | Indemn |
|                 |  OF    |             | 015-Pr | 0           |         | ity    |
|                 | [place]  |             | esent  |             |         |        |
|                 | MDCR   |             |        |             |         |        |
|                 | SUPPL  |             |        |             |         |        |
+-----------------+--------+-------------+--------+-------------+---------+--------+
 
 
 
+------------------+--------+-------------+--------+-------------+---------------------+
| Guarantor Name   | [employer]oun | Relation to | Date   | Phone       | Billing Address     |
|                  | t Type |  Patient    | of     |             |                     |
|                  |        |             | Birth  |             |                     |
+------------------+--------+-------------+--------+-------------+---------------------+
| Alex Hays | Person | Self        | [birth year]             |   [address]   |
|                  | al/Fam |             | 1945   | [phone] | [place] [address] |
|                  | chyna    |             |        | [address] (Home)    |                     |
+------------------+--------+-------------+--------+-------------+---------------------+
 
| Alex Hays | Person | Self        | [birth year]             |   [address]   |
|                  | al/Fam |             | 1945   | [phone] | [place] [address] |
|                  | chyna    |             |        | 8 (Home)    |                     |
+------------------+--------+-------------+--------+-------------+---------------------+
 
 
 
 Advance Directives
 Patient has advance care planning documents on file. For more information, please contact:[facility] [place] [facility] [place] [address]

## 2019-10-27 NOTE — NUR
PT ASSESSMENT COMPLETE. PT RESTING IN BED WITH MULTIPLE VISITORS AT BEDSIDE.
PT RATES PAIN 3-4/10. DENIES NAUSEA OR SOB. LLE OUTLINED, REDNESS WITHIN
OUTLINED AREA, WARM TO TOUCH, ELEVATED ON PILLOW. PT AND WIFE DENY NEEDS AT
THIS TIME. CALL LIGHT IN REACH.

## 2019-10-27 NOTE — NUR
PT RESTING IN BED WITH EYES CLOSED. RESPIRATIONS EVEN AND UNLABORED. HOME CPAP
IN PLACE APPROPRAITELY. PT DOES NOT WAKE WHILE WRITER IN DOORWAY. CALL LIGHT
IN REACH.

## 2019-10-27 NOTE — XMS
Encounter Summary
  Created on: 10/27/2019
 
 Alex Hays
 External Reference #: [id number]
 : [birth year]
 Sex: Male
 
 Demographics
 
 
+-----------------------+----------------------+
| Address               | [address]      |
|                       | [place]  [address] |
+-----------------------+----------------------+
| Home Phone            | [phone]      |
+-----------------------+----------------------+
| Preferred Language    | Unknown              |
+-----------------------+----------------------+
| Marital Status        |               |
+-----------------------+----------------------+
| Jain Affiliation | [id number]                 |
+-----------------------+----------------------+
| Race                  | Unknown              |
+-----------------------+----------------------+
| Ethnic Group          | Unknown              |
+-----------------------+----------------------+
 
 
 Author
 
 
+--------------+--------------------------------------------+
| Author       | [facility]  |
|              | and [facility]                                |
+--------------+--------------------------------------------+
| Organization | [facility]  |
|              | and [place]                                |
+--------------+--------------------------------------------+
| Address      | Unknown                                    |
+--------------+--------------------------------------------+
| Phone        | Unavailable                                |
+--------------+--------------------------------------------+
 
 
 
 Support
 
 
+------------+--------------+-------------------+-----------------+
| Name       | Relationship | Address           | Phone           |
+------------+--------------+-------------------+-----------------+
| Frances Hays | ECON         | [address]        | [phone] |
|            |              | [place]   |                 |
|            |              | [address]             |                 |
+------------+--------------+-------------------+-----------------+
 
 
 
 
 Care Team Providers
 
 
+-------------------------+------+-----------------+
| Care Team Member Name   | Role | Phone           |
+-------------------------+------+-----------------+
| Chai Wetzel MD | PCP  | [phone] |
+-------------------------+------+-----------------+
 
 
 
 Reason for Visit
 
 
+-----------+----------+
| Reason    | Comments |
+-----------+----------+
| Follow-up |          |
+-----------+----------+
 
 
 
 Encounter Details
 
 
+--------+-----------+----------------------+----------------------+---------------------+
| Date   | Type      | Department           | Care Team            | Description         |
+--------+-----------+----------------------+----------------------+---------------------+
| 10/18/ | [facility]  |   [facility]     |   Xenia Pulido  | Malignant neoplasm  |
| 2019   | Encounter | [facility]    | MD CHRISTY  [address]  | of prostate (HCC)   |
|        |           | [address] | [address]  [place], [place]    | (Primary Dx)        |
|        |           |   [place], [place]        | [address]  [phone]  |                     |
|        |           | [address]           |  [phone] (Fax)  |                     |
|        |           | [phone]         |  Talib Saha MD   |                     |
|        |           |                      | [address]  [address]    |                     |
|        |           |                      | [address]  [place], [place]     |                     |
|        |           |                      | [address]  [phone]  |                     |
|        |           |                      |  [phone] (Fax)  |                     |
+--------+-----------+----------------------+----------------------+---------------------+
 
 
 
 Social History
 
 
+--------------+-------+-----------+--------+------+
| Tobacco Use  | Types | Packs/Day | Years  | Date |
|              |       |           | Used   |      |
+--------------+-------+-----------+--------+------+
| Never Smoker |       |           |        |      |
+--------------+-------+-----------+--------+------+
 
 
 
+---------------------+---+---+---+
| Smokeless Tobacco:  |   |   |   |
| Never Used          |   |   |   |
+---------------------+---+---+---+
 
 
 
 
+-------------+-----------+---------+--------------+
| Alcohol Use | Drinks/We | oz/Week | Comments     |
|             | ek        |         |              |
+-------------+-----------+---------+--------------+
| Yes         |   0       | 0.0     | occasionally |
|             | Standard  |         |              |
|             | drinks or |         |              |
|             |           |         |              |
|             | equivalen |         |              |
|             | t         |         |              |
+-------------+-----------+---------+--------------+
 
 
 
+------------------+---------------+
| Sex Assigned at  | Date Recorded |
| Birth            |               |
+------------------+---------------+
| Not on file      |               |
+------------------+---------------+
 
 
 
+----------------+-------------+-------------+
| Job Start Date | Occupation  | Industry    |
+----------------+-------------+-------------+
| Not on file    | Not on file | Not on file |
+----------------+-------------+-------------+
 
 
 
+----------------+--------------+------------+
| Travel History | Travel Start | Travel End |
+----------------+--------------+------------+
 
 
 
+-------------------------------------+
| No recent travel history available. |
+-------------------------------------+
 documented as of this encounter
 
 Medications at Time of Discharge
 
 
+----------------------+----------------------+-----------+---------+----------+----------+
| Medication           | Sig                  | Dispensed | Refills | Start    | End Date |
|                      |                      |           |         | Date     |          |
+----------------------+----------------------+-----------+---------+----------+----------+
|   allopurinol        | Take 100 mg by mouth |           | 0       |          |          |
| (ZYLOPRIM) 100 mg    |  every day.          |           |         |          |          |
| tablet               |                      |           |         |          |          |
+----------------------+----------------------+-----------+---------+----------+----------+
|   allopurinol        | Take 300 mg by mouth |           | 0       |          |          |
| (ZYLOPRIM) 300 mg    |  Daily.              |           |         |          |          |
| tablet               |                      |           |         |          |          |
+----------------------+----------------------+-----------+---------+----------+----------+
|   cholecalciferol    | Take 6,000 Units by  |           | 0       |          |          |
 
| (VITAMIN D-3) 1,000  | mouth Daily.         |           |         |          |          |
| units capsule        |                      |           |         |          |          |
+----------------------+----------------------+-----------+---------+----------+----------+
|   clobetasol         |                      |           | 1       | / |          |
| (TEMOVATE) 0.05 %    |                      |           |         | 19       |          |
| external solution    |                      |           |         |          |          |
+----------------------+----------------------+-----------+---------+----------+----------+
|   gabapentin         | TK 1 C PO QHS.       |           | 2       | 10/02/20 |          |
| (NEURONTIN) 100 mg   | INCREASE BY 1 C Q 2  |           |         | 18       |          |
| capsule              | TO 3 NIGHTS UP TO 3  |           |         |          |          |
|                      | CS UP TO 3 CS HS     |           |         |          |          |
+----------------------+----------------------+-----------+---------+----------+----------+
|   NYSTOP [id number]      | APPLY 1 APPLICATION  |           | 3       | 20 |          |
| UNIT/GM powder       | TO AFFECTED AREA BID |           |         | 19       |          |
|                      |  FOR 30 DAYS         |           |         |          |          |
+----------------------+----------------------+-----------+---------+----------+----------+
|   tamsulosin         |                      |           | 0       | 20 |          |
| (FLOMAX) 0.4 mg CAPS |                      |           |         | 18       |          |
+----------------------+----------------------+-----------+---------+----------+----------+
|   triamcinolone      | APPLY A THIN LAYER   |           | 0       | 20 |          |
| (KENALOG) 0.1% cream | TOPICALLY AA BID.    |           |         | 19       |          |
|                      | FOLLOW WITH          |           |         |          |          |
|                      | MOISTURIZER AS       |           |         |          |          |
|                      | DIRECTED             |           |         |          |          |
+----------------------+----------------------+-----------+---------+----------+----------+
|   warfarin           | Take 6 mg by mouth   |           | 0       |          |          |
| (COUMADIN) 1 mg      | every day.           |           |         |          |          |
| tablet               |                      |           |         |          |          |
+----------------------+----------------------+-----------+---------+----------+----------+
|   warfarin           |                      |           | 3       | 20 |          |
| (COUMADIN) 3 MG      |                      |           |         | 19       |          |
| tablet               |                      |           |         |          |          |
+----------------------+----------------------+-----------+---------+----------+----------+
|   warfarin           | Take 4 mg by mouth   |           | 0       |          |          |
| (COUMADIN) 4 MG      | Daily. Takes 4mg for |           |         |          |          |
| tablet               |  5 days, 6mg on      |           |         |          |          |
|                      | Tuesday and Thursday |           |         |          |          |
+----------------------+----------------------+-----------+---------+----------+----------+
 documented as of this encounter
 
 Progress Notes
 Kacy Polk RN - 10/18/2019 1000 PDTDate Cyberknife Treatment Completed for Prostate Ca
ncer: 3/6/2018    
 
 Follow Up Interval: 1.5 Years
 
 
 AUA Score:  22/35 on 9/10/19
    16/35 on 18
    26/35  Pre-treatment
 
 Pt had a Urolift about two months ago to assist with the "free flow" of his urinary functio
n.  He continues to have frequency and urgency.  He denies dribbling, pain, bleeding, and re
ports that his bowel function is at his normal baseline. 
 Nocturia x 3 
 
 
 Recent PSA:  0.137 on 10/9/19
    0.03 on 18 
    0.01 on 18
 
    3.4 Pre-treatment
 
 
 He is currently following with his Urologist, Dr. Mendez in [place], but will reach out
 to us with any questions or concerns.
 
 Next follow-up appointment and PSA:  2 Years, 2020Electronically signed by Kacy STAPLETON RN at 10/21/2019 16:55 PDTdocumented in this encounter
 
 Plan of Treatment
 
 
+-----------------+--------+----------------------+----------------------+
| Name            | Priori | Associated Diagnoses | Order Schedule       |
|                 | ty     |                      |                      |
+-----------------+--------+----------------------+----------------------+
| PSA, Diagnostic | Routin |   Malignant neoplasm | 1 Occurrences        |
|                 | e      |  of prostate (HCC)   | starting 10/21/2019  |
|                 |        |                      | until 10/21/2020     |
+-----------------+--------+----------------------+----------------------+
 documented as of this encounter
 
 Visit Diagnoses
 
 
+----------------------------------------------------------------------------------+
| Diagnosis                                                                        |
+----------------------------------------------------------------------------------+
|   Malignant neoplasm of prostate (HCC) - Primary  Malignant neoplasm of prostate |
+----------------------------------------------------------------------------------+
 documented in this encounter

## 2019-10-27 NOTE — XMS
Encounter Summary
  Created on: 10/27/2019
 
 Alex Hays
 External Reference #: [id number]
 : [birth year]
 Sex: Male
 
 Demographics
 
 
+-----------------------+----------------------+
| Address               | [address]      |
|                       | [place]  [address] |
+-----------------------+----------------------+
| Home Phone            | [phone]      |
+-----------------------+----------------------+
| Preferred Language    | Unknown              |
+-----------------------+----------------------+
| Marital Status        |               |
+-----------------------+----------------------+
| Restoration Affiliation | 1069                 |
+-----------------------+----------------------+
| Race                  | Unknown              |
+-----------------------+----------------------+
| Ethnic Group          | Unknown              |
+-----------------------+----------------------+
 
 
 Author
 
 
+--------------+--------------------------------------------+
| Author       | [facility]  |
|              | and [facility]                                |
+--------------+--------------------------------------------+
| Organization | [facility]  |
|              | and [place]                                |
+--------------+--------------------------------------------+
| Address      | Unknown                                    |
+--------------+--------------------------------------------+
| Phone        | Unavailable                                |
+--------------+--------------------------------------------+
 
 
 
 Support
 
 
+------------+--------------+-------------------+-----------------+
| Name       | Relationship | Address           | Phone           |
+------------+--------------+-------------------+-----------------+
| Frances Hays | ECON         | [address]        | [phone] |
|            |              | [place]   |                 |
|            |              | [address]             |                 |
+------------+--------------+-------------------+-----------------+
 
 
 
 
 Care Team Providers
 
 
+-------------------------+------+-----------------+
| Care Team Member Name   | Role | Phone           |
+-------------------------+------+-----------------+
| Chai Wetzel MD | PCP  | [phone] |
+-------------------------+------+-----------------+
 
 
 
 Encounter Details
 
 
+--------+-------------+----------------------+----------------------+---------------------+
| Date   | Type        | Department           | Care Team            | Description         |
+--------+-------------+----------------------+----------------------+---------------------+
| 09/10/ | Orders Only |   [facility]     |   Talib Saha MD  | Malignant neoplasm  |
| 2019   |             | [facility]    |  [address]   | of prostate (HCC)   |
|        |             | [address] | [address]  [place], [place]     | (Primary Dx)        |
|        |             |   [place], [place]        | [address]  [phone]  |                     |
|        |             | [address]           |  [phone] (Fax)  |                     |
|        |             | [phone]         |                      |                     |
+--------+-------------+----------------------+----------------------+---------------------+
 
 
 
 Social History
 
 
+--------------+-------+-----------+--------+------+
| Tobacco Use  | Types | Packs/Day | Years  | Date |
|              |       |           | Used   |      |
+--------------+-------+-----------+--------+------+
| Never Smoker |       |           |        |      |
+--------------+-------+-----------+--------+------+
 
 
 
+---------------------+---+---+---+
| Smokeless Tobacco:  |   |   |   |
| Never Used          |   |   |   |
+---------------------+---+---+---+
 
 
 
+-------------+-----------+---------+--------------+
| Alcohol Use | Drinks/We | oz/Week | Comments     |
|             | ek        |         |              |
+-------------+-----------+---------+--------------+
| Yes         |   0       | 0.0     | occasionally |
|             | Standard  |         |              |
|             | drinks or |         |              |
|             |           |         |              |
|             | equivalen |         |              |
|             | t         |         |              |
+-------------+-----------+---------+--------------+
 
 
 
 
+------------------+---------------+
| Sex Assigned at  | Date Recorded |
| Birth            |               |
+------------------+---------------+
| Not on file      |               |
+------------------+---------------+
 
 
 
+----------------+-------------+-------------+
| Job Start Date | Occupation  | Industry    |
+----------------+-------------+-------------+
| Not on file    | Not on file | Not on file |
+----------------+-------------+-------------+
 
 
 
+----------------+--------------+------------+
| Travel History | Travel Start | Travel End |
+----------------+--------------+------------+
 
 
 
+-------------------------------------+
| No recent travel history available. |
+-------------------------------------+
 documented as of this encounter
 
 Plan of Treatment
 
 
+-----------------+--------+----------------------+----------------------+
| Name            | Priori | Associated Diagnoses | Order Schedule       |
|                 | ty     |                      |                      |
+-----------------+--------+----------------------+----------------------+
| PSA, Diagnostic | Routin |   Malignant neoplasm | 1 Occurrences        |
|                 | e      |  of prostate (HCC)   | starting 09/10/2019  |
|                 |        |                      | until 09/10/2020     |
+-----------------+--------+----------------------+----------------------+
 documented as of this encounter
 
 Visit Diagnoses
 
 
+----------------------------------------------------------------------------------+
| Diagnosis                                                                        |
+----------------------------------------------------------------------------------+
|   Malignant neoplasm of prostate (HCC) - Primary  Malignant neoplasm of prostate |
+----------------------------------------------------------------------------------+
 documented in this encounter

## 2019-10-27 NOTE — NUR
PATIENT REQUESTED TO HAVE CASEMANLORIE COME AND TALK WITH HIM WHEN
WIFE PATTIE WAS PRESENT IN CASE HE WAS FORGETTING SOMETHING. NO OTHER NEEDS AT
THIS TIME. INITIAL CASE MANAGEMENT ASSESMENT DONE. PATIENT ANTICIPATES
RETURNING HOME INDEPENDANT.

## 2019-10-27 NOTE — XMS
Clinical Summary
  Created on: 10/27/2019
 
 Alex Hays
 External Reference #: [id number]
 : [birth year]
 Sex: Male
 
 Demographics
 
 
+-----------------------+----------------------+
| Address               | [address]      |
|                       | [place]  [address] |
+-----------------------+----------------------+
| Home Phone            | [phone]      |
+-----------------------+----------------------+
| Preferred Language    | Unknown              |
+-----------------------+----------------------+
| Marital Status        |               |
+-----------------------+----------------------+
| Adventism Affiliation | [id number]                 |
+-----------------------+----------------------+
| Race                  | Unknown              |
+-----------------------+----------------------+
| Ethnic Group          | Unknown              |
+-----------------------+----------------------+
 
 
 Author
 
 
+--------------+--------------------------------------------+
| Author       | [facility]  |
|              | and [facility]                                |
+--------------+--------------------------------------------+
| Organization | [facility]  |
|              | and [place]                                |
+--------------+--------------------------------------------+
| Address      | Unknown                                    |
+--------------+--------------------------------------------+
| Phone        | Unavailable                                |
+--------------+--------------------------------------------+
 
 
 
 Support
 
 
+------------+--------------+-------------------+-----------------+
| Name       | Relationship | Address           | Phone           |
+------------+--------------+-------------------+-----------------+
| Frances Hays | ECON         | [address]        | [phone] |
|            |              | [place]   |                 |
|            |              | [address]             |                 |
+------------+--------------+-------------------+-----------------+
 
 
 
 
 Care Team Providers
 
 
+-------------------------+------+-----------------+
| Care Team Member Name   | Role | Phone           |
+-------------------------+------+-----------------+
| Chai Wetzel MD | PCP  | [phone] |
+-------------------------+------+-----------------+
 
 
 
 Allergies
 No Known Allergies
 
 Medications
 
 
+----------------------+----------------------+-----------+---------+------+------+-------+
| Medication           | Sig                  | Dispensed | Refills | Star | End  | Statu |
|                      |                      |           |         | t    | Date | s     |
|                      |                      |           |         | Date |      |       |
+----------------------+----------------------+-----------+---------+------+------+-------+
|   allopurinol        | Take 300 mg by mouth |           | 0       |      |      | Activ |
| (ZYLOPRIM) 300 mg    |  Daily.              |           |         |      |      | e     |
| tablet               |                      |           |         |      |      |       |
+----------------------+----------------------+-----------+---------+------+------+-------+
|   cholecalciferol    | Take 6,000 Units by  |           | 0       |      |      | Activ |
| (VITAMIN D-3) 1,000  | mouth Daily.         |           |         |      |      | e     |
| units capsule        |                      |           |         |      |      |       |
+----------------------+----------------------+-----------+---------+------+------+-------+
|   warfarin           | Take 4 mg by mouth   |           | 0       |      |      | Activ |
| (COUMADIN) 4 MG      | Daily. Takes 4mg for |           |         |      |      | e     |
| tablet               |  5 days, 6mg on      |           |         |      |      |       |
|                      | Tuesday and Thursday |           |         |      |      |       |
+----------------------+----------------------+-----------+---------+------+------+-------+
|   tamsulosin         |                      |           | 0       | 03/0 |      | Activ |
| (FLOMAX) 0.4 mg CAPS |                      |           |         | 6/20 |      | e     |
|                      |                      |           |         | 18   |      |       |
+----------------------+----------------------+-----------+---------+------+------+-------+
|   allopurinol        | Take 100 mg by mouth |           | 0       |      |      | Activ |
| (ZYLOPRIM) 100 mg    |  every day.          |           |         |      |      | e     |
| tablet               |                      |           |         |      |      |       |
+----------------------+----------------------+-----------+---------+------+------+-------+
|   warfarin           | Take 6 mg by mouth   |           | 0       |      |      | Activ |
| (COUMADIN) 1 mg      | every day.           |           |         |      |      | e     |
| tablet               |                      |           |         |      |      |       |
+----------------------+----------------------+-----------+---------+------+------+-------+
|   clobetasol         |                      |           | 1       | 03/0 |      | Activ |
| (TEMOVATE) 0.05 %    |                      |           |         | 5/20 |      | e     |
| external solution    |                      |           |         | 19   |      |       |
+----------------------+----------------------+-----------+---------+------+------+-------+
|   gabapentin         | TK 1 C PO QHS.       |           | 2       | 10/0 |      | Activ |
| (NEURONTIN) 100 mg   | INCREASE BY 1 C Q 2  |           |         | 2/20 |      | e     |
| capsule              | TO 3 NIGHTS UP TO 3  |           |         | 18   |      |       |
|                      | CS UP TO 3 CS HS     |           |         |      |      |       |
+----------------------+----------------------+-----------+---------+------+------+-------+
|   NYSTOP [id number]      | APPLY 1 APPLICATION  |           | 3       | 07/0 |      | Activ |
| UNIT/GM powder       | TO AFFECTED AREA BID |           |         | 1/20 |      | e     |
|                      |  FOR 30 DAYS         |           |         | 19   |      |       |
 
+----------------------+----------------------+-----------+---------+------+------+-------+
|   triamcinolone      | APPLY A THIN LAYER   |           | 0       | 05/0 |      | Activ |
| (KENALOG) 0.1% cream | TOPICALLY AA BID.    |           |         | 1/20 |      | e     |
|                      | FOLLOW WITH          |           |         | 19   |      |       |
|                      | MOISTURIZER AS       |           |         |      |      |       |
|                      | DIRECTED             |           |         |      |      |       |
+----------------------+----------------------+-----------+---------+------+------+-------+
|   warfarin           |                      |           | 3       | 05/2 |      | Activ |
| (COUMADIN) 3 MG      |                      |           |         | 4/20 |      | e     |
| tablet               |                      |           |         | 19   |      |       |
+----------------------+----------------------+-----------+---------+------+------+-------+
 
 
 
 Active Problems
 
 
+------------------------------------------------------------------+------------+
| Problem                                                          | Noted Date |
+------------------------------------------------------------------+------------+
| Malignant neoplasm of prostate                                   | 2018 |
+------------------------------------------------------------------+------------+
| Lumbar disc herniation                                           | 2017 |
+------------------------------------------------------------------+------------+
| Intervertebral disc disorder with radiculopathy of lumbar region | 2017 |
+------------------------------------------------------------------+------------+
| Lumbar radiculopathy                                             | 2017 |
+------------------------------------------------------------------+------------+
| Lumbar spinal stenosis                                           | 2017 |
+------------------------------------------------------------------+------------+
| Lumbar foraminal stenosis                                        | 2017 |
+------------------------------------------------------------------+------------+
| DDD (degenerative disc disease), lumbar                          | 2017 |
+------------------------------------------------------------------+------------+
| S/P laminectomy - L4/L5                                          | 2017 |
+------------------------------------------------------------------+------------+
| Ulnar neuropathy                                                 | 2013 |
+------------------------------------------------------------------+------------+
| SLEEP APNEA, OBSTRUCTIVE                                         | 2008 |
+------------------------------------------------------------------+------------+
| ATRIAL FIBRILLATION                                              | 2008 |
+------------------------------------------------------------------+------------+
 
 
 
 Encounters
 
 
+--------+-------------+--------------------+----------------------+---------------------+
| Date   | Type        | Specialty          | Care Team            | Description         |
+--------+-------------+--------------------+----------------------+---------------------+
| 10/18/ | Hospital    | Radiation Oncology |   Xenia Pulido  | Malignant neoplasm  |
| 2019   | Encounter   |                    | MD Yifan HARRISON,        | of prostate (HCC)   |
|        |             |                    | MD Talib           | (Primary Dx)        |
+--------+-------------+--------------------+----------------------+---------------------+
| 09/10/ | Orders Only | Radiation Oncology |   Talib Saha MD  | Malignant neoplasm  |
|    |             |                    |                      | of prostate (HCC)   |
|        |             |                    |                      | (Primary Dx)        |
+--------+-------------+--------------------+----------------------+---------------------+
 from Last 3 Months
 
 
 Family History
 
 
+----------------------+-----------+------+----------+
| Medical History      | Relation  | Name | Comments |
+----------------------+-----------+------+----------+
| Colon cancer         | Brother   |      |          |
+----------------------+-----------+------+----------+
| Heart surgery        | Brother   |      |          |
+----------------------+-----------+------+----------+
| No known problems    | Child     |      |          |
+----------------------+-----------+------+----------+
| No known problems    | Child     |      |          |
+----------------------+-----------+------+----------+
| No known problems    | Child     |      |          |
+----------------------+-----------+------+----------+
| No known problems    | Child     |      |          |
+----------------------+-----------+------+----------+
| Cancer               | Father    |      |          |
+----------------------+-----------+------+----------+
| Deep vein thrombosis | Father    |      |          |
+----------------------+-----------+------+----------+
| No known problems    | Maternal  |      |          |
|                      | Grandfath |      |          |
|                      | er        |      |          |
+----------------------+-----------+------+----------+
| No known problems    | Maternal  |      |          |
|                      | Grandmoth |      |          |
|                      | er        |      |          |
+----------------------+-----------+------+----------+
| Cancer               | Mother    |      |          |
+----------------------+-----------+------+----------+
| Hypertension         | Mother    |      |          |
+----------------------+-----------+------+----------+
| Gout                 | Paternal  |      |          |
|                      | Grandfath |      |          |
|                      | er        |      |          |
+----------------------+-----------+------+----------+
| Hypertension         | Paternal  |      |          |
|                      | Grandmoth |      |          |
|                      | er        |      |          |
+----------------------+-----------+------+----------+
 
 
 
+----------------------+------+----------+-----------------------------+
| Relation             | Name | Status   | Comments                    |
+----------------------+------+----------+-----------------------------+
| Brother              |      |  | HEART SURGERY COMPLICATIONS |
|                      |      |   (Age   |                             |
|                      |      | 66)      |                             |
+----------------------+------+----------+-----------------------------+
| Brother              |      |          |                             |
+----------------------+------+----------+-----------------------------+
| Child                |      | Other    | STATUS NOT LISTED           |
+----------------------+------+----------+-----------------------------+
| Child                |      | Other    | STATUS NOT LISTED           |
+----------------------+------+----------+-----------------------------+
| Child                |      | Other    | STATUS NOT LISTED           |
 
+----------------------+------+----------+-----------------------------+
| Child                |      | Other    | STATUS NOT LISTED           |
+----------------------+------+----------+-----------------------------+
| Child                |      |          |                             |
+----------------------+------+----------+-----------------------------+
| Child                |      |          |                             |
+----------------------+------+----------+-----------------------------+
| Child                |      |          |                             |
+----------------------+------+----------+-----------------------------+
| Child                |      |          |                             |
+----------------------+------+----------+-----------------------------+
| Father               |      |  |                             |
|                      |      |   (Age   |                             |
|                      |      | 84)      |                             |
+----------------------+------+----------+-----------------------------+
| Maternal Grandfather |      |  |                             |
+----------------------+------+----------+-----------------------------+
| Maternal Grandmother |      |  |                             |
+----------------------+------+----------+-----------------------------+
| Mother               |      |  |                             |
|                      |      |   (Age   |                             |
|                      |      | 86)      |                             |
+----------------------+------+----------+-----------------------------+
| Paternal Grandfather |      |  |                             |
+----------------------+------+----------+-----------------------------+
| Paternal Grandmother |      |  |                             |
+----------------------+------+----------+-----------------------------+
 
 
 
 Social History
 
 
+--------------+-------+-----------+--------+------+
| Tobacco Use  | Types | Packs/Day | Years  | Date |
|              |       |           | Used   |      |
+--------------+-------+-----------+--------+------+
| Never Smoker |       |           |        |      |
+--------------+-------+-----------+--------+------+
 
 
 
+---------------------+---+---+---+
| Smokeless Tobacco:  |   |   |   |
| Never Used          |   |   |   |
+---------------------+---+---+---+
 
 
 
+-------------+-----------+---------+--------------+
| Alcohol Use | Drinks/We | oz/Week | Comments     |
|             | ek        |         |              |
+-------------+-----------+---------+--------------+
| Yes         |   0       | 0.0     | occasionally |
|             | Standard  |         |              |
|             | drinks or |         |              |
|             |           |         |              |
|             | equivalen |         |              |
|             | t         |         |              |
+-------------+-----------+---------+--------------+
 
 
 
 
+------------------+---------------+
| Sex Assigned at  | Date Recorded |
| Birth            |               |
+------------------+---------------+
| Not on file      |               |
+------------------+---------------+
 
 
 
+----------------+-------------+-------------+
| Job Start Date | Occupation  | Industry    |
+----------------+-------------+-------------+
| Not on file    | Not on file | Not on file |
+----------------+-------------+-------------+
 
 
 
+----------------+--------------+------------+
| Travel History | Travel Start | Travel End |
+----------------+--------------+------------+
 
 
 
+-------------------------------------+
| No recent travel history available. |
+-------------------------------------+
 
 
 
 Last Filed Vital Signs
 
 
+-------------------+-------------------+---------------------+
| Vital Sign        | Reading           | Time Taken          |
+-------------------+-------------------+---------------------+
| Blood Pressure    | 123/79            | 2018 1157 PST |
+-------------------+-------------------+---------------------+
| Pulse             | 87                | [id number] PDT |
+-------------------+-------------------+---------------------+
| Temperature       | -                 | -                   |
+-------------------+-------------------+---------------------+
| Respiratory Rate  | 16                | 2017 PDT |
+-------------------+-------------------+---------------------+
| Oxygen Saturation | -                 | -                   |
+-------------------+-------------------+---------------------+
| Inhaled Oxygen    | -                 | -                   |
| Concentration     |                   |                     |
+-------------------+-------------------+---------------------+
| Weight            | 104.8 kg (231 lb) | 2018 PST |
+-------------------+-------------------+---------------------+
| Height            | 175.3 cm (5' 9")  | 2018 PST |
+-------------------+-------------------+---------------------+
| Body Mass Index   | 34.11             | 2018 1157 PST |
+-------------------+-------------------+---------------------+
 
 
 
 
 Plan of Treatment
 
 
+---------------------+-----------+---------------------------------+----------+
| Health Maintenance  | Due Date  | Last Done                       | Comments |
+---------------------+-----------+---------------------------------+----------+
| Vaccine:            | 10/12/196 |                                 |          |
| Dtap/Tdap/Td (1 -   | 2         |                                 |          |
| Tdap)               |           |                                 |          |
+---------------------+-----------+---------------------------------+----------+
| Vaccine:            | 10/12/200 |                                 |          |
| Pneumococcal 65+    | 8         |                                 |          |
| Low/Medium Risk (1  |           |                                 |          |
| of 2 - PCV13)       |           |                                 |          |
+---------------------+-----------+---------------------------------+----------+
| Vaccine: Zoster (2  |  | 2014                      |          |
| of 3)               | 4         |                                 |          |
+---------------------+-----------+---------------------------------+----------+
| Vaccine: Influenza  |  | 2018, 2018,         |          |
| (#1)                | 9         | 2016, Additional history  |          |
|                     |           | exists                          |          |
+---------------------+-----------+---------------------------------+----------+
 
 
 
 Results
 Not on filefrom Last 3 Months
 
 Insurance
 
 
+-----------------+--------+-------------+--------+-------------+---------+--------+
| Payer           | Benefi | Subscriber  | Effect | Phone       | Address | Type   |
|                 | t Plan | ID          | flor    |             |         |        |
|                 |  /     |             | Dates  |             |         |        |
|                 | Group  |             |        |             |         |        |
+-----------------+--------+-------------+--------+-------------+---------+--------+
| MEDICARE        | MEDICA | [id number]  | 10/1/2 | [phone] |         | [employer] |
|                 | RE     |             | 008-Pr | 5           |         | re     |
|                 | PART A |             | esent  |             |         |        |
|                 |  AND B |             |        |             |         |        |
+-----------------+--------+-------------+--------+-------------+---------+--------+
| [employer] | [employer] | [id number]    |  | [phone] |         | Indemn |
|                 |  OF    |             | 015-Pr | 0           |         | ity    |
|                 | [employer]  |             | esent  |             |         |        |
|                 | MDCR   |             |        |             |         |        |
|                 | SUPPL  |             |        |             |         |        |
+-----------------+--------+-------------+--------+-------------+---------+--------+
 
 
 
+------------------+--------+-------------+--------+-------------+---------------------+
| Guarantor Name   | [employer]oun | Relation to | Date   | Phone       | Billing Address     |
|                  | t Type |  Patient    | of     |             |                     |
|                  |        |             | Birth  |             |                     |
+------------------+--------+-------------+--------+-------------+---------------------+
| Alex Hays | Person | Self        | [birth year]             |   [address]   |
|                  | al/Fam |             | 1945   | [phone] | [place] [address] |
|                  | chyna    |             |        | 8 (Home)    |                     |
+------------------+--------+-------------+--------+-------------+---------------------+
 
| Alex Hays | Person | Self        | [birth year]             |   [address]   |
|                  | al/Fam |             | 1945   | [phone] | [place] [address] |
|                  | chyna    |             |        | 8 (Home)    |                     |
+------------------+--------+-------------+--------+-------------+---------------------+
 
 
 
 Advance Directives
 Patient has advance care planning documents on file. For more information, please contact:[facility] [place] [address]

## 2019-10-28 NOTE — NUR
PT IS ALERT, ORIENTED AND SUPPORTED BY HIS WIFE PATTIE AT BS. PTS' WIFE WAS A
LITTLE UPSET THAT PT DIDN'T COME IN SOONER. SHARED MY EXPERIENCE WITH THEM AND
GAVE ENCOURAGEMENT. WILL FOLLOW AS NEEDED

## 2019-10-28 NOTE — NUR
PT SLEPT WELL THIS SHIFT. PRN TYLENOL X 1 FOR HEADACHE. NO NAUSEA OR SOB. LLE
OUTLINED. REDNESS REMAINED WITHIN OUTLINE. WARMTH TO LLE DECREASING
THROUGHOUT SHIFT. PT KEPT LLE ELEVATED THROUGHOUT SHIFT. CMS INTACT TO ALL
EXREMITIES. 1 PA. IVF INFUSING.

## 2019-10-28 NOTE — NUR
Patient appointment for Coumadin Clinic has been rescheduled from 10/29/19 to
Tuesday 11/5/19 @ 2854

## 2019-10-28 NOTE — NUR
bedside report received from offgoing rnamol. pt resting in bed. denies
needs at this time. call light in reach.

## 2019-10-28 NOTE — NUR
VITAL SIGNS OBTAINED. PT STATES THAT TYLENOL HELPED, DENIES PAIN. PT RESTING
IN BED WITH CPAP ON APPROPRAITELY. DENIES FURTHER NEEDS. CALL LIGHT IN REACH.

## 2019-10-28 NOTE — NUR
PT ASSESSMENT COMPLETE. PT RATES PAIN 4/10 TO LLE. PT DENIES NAUSEA. PT AND
HIS WIFE ARE NOTICING PT IS WINDED WITH ACTIVITY. LUNG SOUNDS CLEAR. PT DENIES
SOB AFTER RESTING. LLE OUTLINED, REDNESS REMAINS WITHIN OUTLINE. LLE WARM TO
THE TOUCH. PITTING EDEMA PRESENT TO L BHATTI AND KNEE, 2+. TYLENOL ADMINISTERED
WITH CRACKERS AND 7UP. PT DENIES FURTHER NEEDS AT THIS TIME. WIFE REMAINS AT
BEDSIDE. CALL LIGHT WITHIN REACH.

## 2019-10-28 NOTE — NUR
RECIEVED BEDSIDE REPORT FROM JODY MCDONALD.  PT AWAKE AND ALERT IN BED, HAD CPAP
ON ALL NIGHT.  REDNESS IS RECEDING FROM OUTLINE. LEFT LEG IS MUCH LESS SWOLLEN
AND LESS WARM.  PT STATES HE FEELS BETTER.

## 2019-10-28 NOTE — NUR
PT RESTING IN BED, CNA AT BEDSIDE TAKING VS. PT STATES THAT PAIN IS WELL
CONTROLLED. REPORTS SLIGHT SORENESS IN LLE. DENIES NEED FOR PRN PAIN
MEDICATION. PT DENIES NEEDS AT THIS TIME. CALL LIGHT IN REACH. CNA REMAINS AT
BEDSIDE.

## 2019-10-28 NOTE — NUR
VITAL SIGNS DONE, INPUT AND OUTPUT UPDATED, LUNCH WAS NOT ORDERED THIS CNA
ORDERED LUNCH FOR THE PATIENT AND REFILLED CUP WITH APPLE JUICE

## 2019-10-28 NOTE — NUR
PT ASSESSMENT COMPLETE. PT DENIES PAIN, NAUESA, OR SOB. LLE ELEVATED ON
PILLOW. REDNESS RECEDING, REMAINS WITHIN OUTLINED AREA. SLIGHT WARMTH TO
EXTREMITY REMAINS, IMPROVED FROM PREVIOUS ASSESSMENT. PITTING EDEMA TO LLE 2+.
PT UP TO BATHROOM AND BACK TO BED WITH 1PA. PT TOLERATED WELL. PT DENIES
FURTHER NEEDS AT THIS TIME, PT ABLE TO PLACE CPAP BACK ON INDEPENDENTLY. CALL
LIGHT WITHIN REACH.

## 2019-10-29 NOTE — NUR
DISCHARGE EDCUATION AND PACKET GIVEN PT'S SPOUSE IN ROOM FOR EDUCAITON ON
MEDICATIONS LAST DOSE NEXT DOSE, FOLLOW UP APPOINTMENT FOR [facility] CL[facility]IC AND
TO SEE  NEXT WEEK. PT GIVEN ANTIBACTERIAL SOAP FOR DAILY TREATMENT. IV
SITE REMOVED CATH INTACT. DISCUSSED SIGNS AND SYMPTOMS TO SEEK IMMEDIATE
MEDICAL CARE. PT AND SPOUSE VERBALIZE EDCUATION BACK AND NO FURTHER QUESTION.

## 2019-10-29 NOTE — NUR
Spoke with pt. and wife.  Plan to go home today.  Wife feels comfortable with
pt. going home.  Pt. is anxious to go home.  Denies need for discharge.

## 2019-10-29 NOTE — NUR
PT ASSESSMENT COMPLETE. PT DENIES PAIN, NAUSEA, OR SOB. LLE REDNESS REMAINS
WITHIN OUTLINED AREA. WARMTH NOTED AROUND L KNEE AREA, LESS THAN PREVIOUS
ASSESSMENT. EDEMA CONTINUES TO LLE. PT HAS LLE ELEVATED ON PILLOW. PT UP TO
USE THE BATHROOM INDEPENDENTLY WHILE WRITER IN ROOM. PT TOLERATED WELL. PT
DENIES FURTHER NEEDS AT THIS TIME. CALL LIGHT IN REACH.

## 2019-10-29 NOTE — NUR
PT SLEPT MOST OF NIGHT. HOME CPAP USED APPROPRIATELY. TYLENOL X 1 FOR LLE
PAIN. REDNESS TO LLE WITHIN OUTLINED AREA. LLE WARM TO TOUCH WITH EDEMA
PRESENT. PT KEEPING LLE ELEVATED. IVF INFUSING. CLINDAMYACIN. IND IN ROOM TO
YULIANA BREWER QS.

## 2019-10-29 NOTE — NUR
PT RESTING IN BED, WIFE PATTIE AT BS. PATTIE SEEMS TO BE SOMEWHAT OVERWHELMED
WITH THIS ADDED TO WHAT PT HAS ALREADY BEEN DEALING WITH MEDICALLY. SPENT TIME
WITH BOTH DEBRIEFING AND GIVING ENCOURAGEMENT. HAD PRAYER WITH BOTH, PT
WAITING FOR DC ORDERS TO BE READY. BOTH EXPRESSED GREAT TRUST AND APPRECIATION
IN DRS MILLICENT AND PADMA. WILL FOLLOW AS NEEDED

## 2019-10-29 NOTE — NUR
PT RESTING IN BED WITH EYES CLOSED. CPAP MACHINE OFF AT THIS TIME. PT DOES NOT
WAKE WHILE WRITER IN DOORWAY. CALL LIGHT IN REACH.

## 2019-10-29 NOTE — NUR
PT LYING IN BED, TALKING ON PHONE. BR UP X2. DENIES PAIN AT THIS TIME. ALERT
ET ORIENTED. COFFEE REQUESTED ET PROVIDED. CALL LIGHT IN REACH.

## 2019-10-29 NOTE — NUR
PATIENT WAS A WKE FREASH WATER GIVEN CALL LIGHT N REACH, FACE WASHED
BREAKFAST IN ROOM. CALL LIGHT IN REACH.

## 2020-03-03 ENCOUNTER — HOSPITAL ENCOUNTER (EMERGENCY)
Dept: HOSPITAL 46 - ED | Age: 75
Discharge: HOME | End: 2020-03-03
Payer: MEDICARE

## 2020-03-03 VITALS — WEIGHT: 228 LBS | HEIGHT: 69 IN | BODY MASS INDEX: 33.77 KG/M2

## 2020-03-03 DIAGNOSIS — J01.10: ICD-10-CM

## 2020-03-03 DIAGNOSIS — Z79.01: ICD-10-CM

## 2020-03-03 DIAGNOSIS — J40: Primary | ICD-10-CM

## 2020-03-03 DIAGNOSIS — Z79.899: ICD-10-CM

## 2020-03-03 DIAGNOSIS — Z85.46: ICD-10-CM

## 2020-03-03 DIAGNOSIS — I48.91: ICD-10-CM

## 2020-03-03 NOTE — EKG
[facility]
                                    [address]
                                  [place], [place]  [address]
_________________________________________________________________________________________
                                                                 Signed   
 
 
Atrial fibrillation
Low voltage QRS
Inferior infarct , age undetermined
Cannot rule out Anterior infarct , age undetermined
Abnormal ECG
No previous ECGs available
Confirmed by BRIDGET CALVIN DO ([id number]) on 3/3/2020 5:50:22 PM
 
 
 
 
 
 
 
 
 
 
 
 
 
 
 
 
 
 
 
 
 
 
 
 
 
 
 
 
 
 
 
 
 
 
 
 
 
 
    Electronically Signed By: BRIDGET CALVIN DO  03/03/20 1750
_________________________________________________________________________________________
PATIENT NAME:     FABIANO SALGADO                      
MEDICAL RECORD #: [id number]                     Electrocardiogram             
          ACCT #: [id number]  
DATE OF BIRTH:   [birth year]                                       
PHYSICIAN:   BRIDGET CALVIN DO                     REPORT #: [id number]
REPORT IS CONFIDENTIAL AND NOT TO BE RELEASED WITHOUT AUTHORIZATION